# Patient Record
Sex: FEMALE | Race: WHITE | NOT HISPANIC OR LATINO | Employment: OTHER | URBAN - METROPOLITAN AREA
[De-identification: names, ages, dates, MRNs, and addresses within clinical notes are randomized per-mention and may not be internally consistent; named-entity substitution may affect disease eponyms.]

---

## 2023-04-03 DIAGNOSIS — F41.8 DEPRESSION WITH ANXIETY: ICD-10-CM

## 2023-04-03 DIAGNOSIS — E11.65 UNCONTROLLED TYPE 2 DIABETES MELLITUS WITH HYPERGLYCEMIA (MULTI): Primary | ICD-10-CM

## 2023-04-03 PROBLEM — R41.89 BRAIN FOG: Status: ACTIVE | Noted: 2023-04-03

## 2023-04-03 PROBLEM — R79.89 HIGH SERUM FERRITIN: Status: ACTIVE | Noted: 2023-04-03

## 2023-04-03 PROBLEM — R06.81 WITNESSED APNEIC SPELLS: Status: ACTIVE | Noted: 2023-04-03

## 2023-04-03 PROBLEM — R00.0 TACHYCARDIA: Status: ACTIVE | Noted: 2023-04-03

## 2023-04-03 PROBLEM — N89.8 VAGINAL IRRITATION: Status: ACTIVE | Noted: 2023-04-03

## 2023-04-03 PROBLEM — R06.02 SOBOE (SHORTNESS OF BREATH ON EXERTION): Status: ACTIVE | Noted: 2023-04-03

## 2023-04-03 PROBLEM — R68.89 WORSENING FUNCTIONAL ENDURANCE: Status: ACTIVE | Noted: 2023-04-03

## 2023-04-03 PROBLEM — G89.29 CHRONIC PAIN: Status: ACTIVE | Noted: 2023-04-03

## 2023-04-03 PROBLEM — R30.0 BURNING WITH URINATION: Status: ACTIVE | Noted: 2023-04-03

## 2023-04-03 PROBLEM — N89.8 VAGINAL DISCHARGE: Status: ACTIVE | Noted: 2023-04-03

## 2023-04-03 PROBLEM — R82.998 LEUKOCYTES IN URINE: Status: ACTIVE | Noted: 2023-04-03

## 2023-04-03 PROBLEM — R42 DIZZINESS: Status: ACTIVE | Noted: 2023-04-03

## 2023-04-03 PROBLEM — R79.89 ABNORMAL TSH: Status: ACTIVE | Noted: 2023-04-03

## 2023-04-03 PROBLEM — J44.9 COPD (CHRONIC OBSTRUCTIVE PULMONARY DISEASE) (MULTI): Status: ACTIVE | Noted: 2023-04-03

## 2023-04-03 PROBLEM — E78.2 MIXED HYPERLIPIDEMIA: Status: ACTIVE | Noted: 2023-04-03

## 2023-04-03 PROBLEM — N89.8 ITCHING OF VAGINA: Status: ACTIVE | Noted: 2023-04-03

## 2023-04-03 PROBLEM — M54.9 CHRONIC BACK PAIN: Status: ACTIVE | Noted: 2023-04-03

## 2023-04-03 PROBLEM — R26.89 IMPAIRMENT OF BALANCE: Status: ACTIVE | Noted: 2023-04-03

## 2023-04-03 PROBLEM — G31.84 MILD COGNITIVE IMPAIRMENT WITH MEMORY LOSS: Status: ACTIVE | Noted: 2023-04-03

## 2023-04-03 PROBLEM — R51.9 HEADACHE: Status: ACTIVE | Noted: 2023-04-03

## 2023-04-03 PROBLEM — G62.9 PERIPHERAL NEUROPATHY: Status: ACTIVE | Noted: 2023-04-03

## 2023-04-03 PROBLEM — R73.03 PREDIABETES: Status: ACTIVE | Noted: 2023-04-03

## 2023-04-03 PROBLEM — E03.9 ACQUIRED HYPOTHYROIDISM: Status: ACTIVE | Noted: 2023-04-03

## 2023-04-03 PROBLEM — I10 BENIGN ESSENTIAL HYPERTENSION: Status: ACTIVE | Noted: 2023-04-03

## 2023-04-03 PROBLEM — R31.9 BLOOD IN URINE: Status: ACTIVE | Noted: 2023-04-03

## 2023-04-03 PROBLEM — G89.29 CHRONIC BACK PAIN: Status: ACTIVE | Noted: 2023-04-03

## 2023-04-03 PROBLEM — R40.0 DAYTIME SOMNOLENCE: Status: ACTIVE | Noted: 2023-04-03

## 2023-04-03 RX ORDER — METFORMIN HYDROCHLORIDE 500 MG/1
500 TABLET ORAL 2 TIMES DAILY
Qty: 180 TABLET | Refills: 1 | Status: SHIPPED | OUTPATIENT
Start: 2023-04-03 | End: 2023-10-02

## 2023-04-03 RX ORDER — VENLAFAXINE HYDROCHLORIDE 150 MG/1
150 CAPSULE, EXTENDED RELEASE ORAL DAILY
Qty: 90 CAPSULE | Refills: 1 | Status: SHIPPED | OUTPATIENT
Start: 2023-04-03 | End: 2023-07-10 | Stop reason: ALTCHOICE

## 2023-04-03 RX ORDER — METFORMIN HYDROCHLORIDE 500 MG/1
500 TABLET ORAL 2 TIMES DAILY
COMMUNITY
End: 2023-04-03 | Stop reason: SDUPTHER

## 2023-04-03 RX ORDER — VENLAFAXINE HYDROCHLORIDE 150 MG/1
150 CAPSULE, EXTENDED RELEASE ORAL
COMMUNITY
Start: 2023-03-06 | End: 2023-04-03 | Stop reason: SDUPTHER

## 2023-05-31 DIAGNOSIS — F41.8 DEPRESSION WITH ANXIETY: Primary | ICD-10-CM

## 2023-05-31 RX ORDER — VENLAFAXINE HYDROCHLORIDE 75 MG/1
75 CAPSULE, EXTENDED RELEASE ORAL DAILY
COMMUNITY
Start: 2023-05-05 | End: 2023-05-31 | Stop reason: SDUPTHER

## 2023-05-31 RX ORDER — VENLAFAXINE HYDROCHLORIDE 75 MG/1
75 CAPSULE, EXTENDED RELEASE ORAL DAILY
Qty: 90 CAPSULE | Refills: 3 | Status: SHIPPED | OUTPATIENT
Start: 2023-05-31 | End: 2024-06-10

## 2023-07-10 ENCOUNTER — OFFICE VISIT (OUTPATIENT)
Dept: PRIMARY CARE | Facility: CLINIC | Age: 68
End: 2023-07-10
Payer: COMMERCIAL

## 2023-07-10 VITALS
HEART RATE: 78 BPM | RESPIRATION RATE: 20 BRPM | DIASTOLIC BLOOD PRESSURE: 68 MMHG | WEIGHT: 179 LBS | SYSTOLIC BLOOD PRESSURE: 126 MMHG | HEIGHT: 65 IN | BODY MASS INDEX: 29.82 KG/M2

## 2023-07-10 DIAGNOSIS — Z00.00 ROUTINE GENERAL MEDICAL EXAMINATION AT A HEALTH CARE FACILITY: Primary | ICD-10-CM

## 2023-07-10 DIAGNOSIS — Z12.11 SCREENING FOR COLON CANCER: ICD-10-CM

## 2023-07-10 DIAGNOSIS — N18.32 STAGE 3B CHRONIC KIDNEY DISEASE (MULTI): ICD-10-CM

## 2023-07-10 DIAGNOSIS — I10 BENIGN ESSENTIAL HYPERTENSION: ICD-10-CM

## 2023-07-10 DIAGNOSIS — M54.50 CHRONIC BILATERAL LOW BACK PAIN WITHOUT SCIATICA: ICD-10-CM

## 2023-07-10 DIAGNOSIS — Z78.0 POST-MENOPAUSAL: ICD-10-CM

## 2023-07-10 DIAGNOSIS — J44.9 CHRONIC OBSTRUCTIVE PULMONARY DISEASE, UNSPECIFIED COPD TYPE (MULTI): ICD-10-CM

## 2023-07-10 DIAGNOSIS — E11.9 TYPE 2 DIABETES MELLITUS WITHOUT COMPLICATION, WITHOUT LONG-TERM CURRENT USE OF INSULIN (MULTI): ICD-10-CM

## 2023-07-10 DIAGNOSIS — E78.2 MIXED HYPERLIPIDEMIA: ICD-10-CM

## 2023-07-10 DIAGNOSIS — R68.89 WORSENING FUNCTIONAL ENDURANCE: ICD-10-CM

## 2023-07-10 DIAGNOSIS — R79.89 HIGH SERUM FERRITIN: ICD-10-CM

## 2023-07-10 DIAGNOSIS — E03.9 ACQUIRED HYPOTHYROIDISM: ICD-10-CM

## 2023-07-10 DIAGNOSIS — F41.8 DEPRESSION WITH ANXIETY: ICD-10-CM

## 2023-07-10 DIAGNOSIS — Z12.31 ENCOUNTER FOR SCREENING MAMMOGRAM FOR MALIGNANT NEOPLASM OF BREAST: ICD-10-CM

## 2023-07-10 DIAGNOSIS — G31.84 MILD COGNITIVE IMPAIRMENT WITH MEMORY LOSS: ICD-10-CM

## 2023-07-10 DIAGNOSIS — G89.29 CHRONIC BILATERAL LOW BACK PAIN WITHOUT SCIATICA: ICD-10-CM

## 2023-07-10 DIAGNOSIS — G47.33 OSA ON CPAP: ICD-10-CM

## 2023-07-10 DIAGNOSIS — Z23 NEED FOR PNEUMOCOCCAL 20-VALENT CONJUGATE VACCINATION: ICD-10-CM

## 2023-07-10 DIAGNOSIS — G62.9 PERIPHERAL POLYNEUROPATHY: ICD-10-CM

## 2023-07-10 PROBLEM — R42 DIZZINESS: Status: RESOLVED | Noted: 2023-04-03 | Resolved: 2023-07-10

## 2023-07-10 PROBLEM — N89.8 VAGINAL IRRITATION: Status: RESOLVED | Noted: 2023-04-03 | Resolved: 2023-07-10

## 2023-07-10 PROBLEM — N89.8 ITCHING OF VAGINA: Status: RESOLVED | Noted: 2023-04-03 | Resolved: 2023-07-10

## 2023-07-10 PROBLEM — R82.998 LEUKOCYTES IN URINE: Status: RESOLVED | Noted: 2023-04-03 | Resolved: 2023-07-10

## 2023-07-10 PROBLEM — R41.89 BRAIN FOG: Status: RESOLVED | Noted: 2023-04-03 | Resolved: 2023-07-10

## 2023-07-10 PROBLEM — E11.65 UNCONTROLLED TYPE 2 DIABETES MELLITUS WITH HYPERGLYCEMIA (MULTI): Status: RESOLVED | Noted: 2023-04-03 | Resolved: 2023-07-10

## 2023-07-10 PROBLEM — N89.8 VAGINAL DISCHARGE: Status: RESOLVED | Noted: 2023-04-03 | Resolved: 2023-07-10

## 2023-07-10 PROBLEM — R31.9 BLOOD IN URINE: Status: RESOLVED | Noted: 2023-04-03 | Resolved: 2023-07-10

## 2023-07-10 PROBLEM — R30.0 BURNING WITH URINATION: Status: RESOLVED | Noted: 2023-04-03 | Resolved: 2023-07-10

## 2023-07-10 PROBLEM — R51.9 HEADACHE: Status: RESOLVED | Noted: 2023-04-03 | Resolved: 2023-07-10

## 2023-07-10 PROBLEM — R06.02 SOBOE (SHORTNESS OF BREATH ON EXERTION): Status: RESOLVED | Noted: 2023-04-03 | Resolved: 2023-07-10

## 2023-07-10 PROCEDURE — 3074F SYST BP LT 130 MM HG: CPT | Performed by: INTERNAL MEDICINE

## 2023-07-10 PROCEDURE — 3044F HG A1C LEVEL LT 7.0%: CPT | Performed by: INTERNAL MEDICINE

## 2023-07-10 PROCEDURE — 90471 IMMUNIZATION ADMIN: CPT | Performed by: INTERNAL MEDICINE

## 2023-07-10 PROCEDURE — 1159F MED LIST DOCD IN RCRD: CPT | Performed by: INTERNAL MEDICINE

## 2023-07-10 PROCEDURE — 99397 PER PM REEVAL EST PAT 65+ YR: CPT | Performed by: INTERNAL MEDICINE

## 2023-07-10 PROCEDURE — 4004F PT TOBACCO SCREEN RCVD TLK: CPT | Performed by: INTERNAL MEDICINE

## 2023-07-10 PROCEDURE — 1160F RVW MEDS BY RX/DR IN RCRD: CPT | Performed by: INTERNAL MEDICINE

## 2023-07-10 PROCEDURE — 3078F DIAST BP <80 MM HG: CPT | Performed by: INTERNAL MEDICINE

## 2023-07-10 PROCEDURE — 90677 PCV20 VACCINE IM: CPT | Performed by: INTERNAL MEDICINE

## 2023-07-10 RX ORDER — BUDESONIDE AND FORMOTEROL FUMARATE DIHYDRATE 160; 4.5 UG/1; UG/1
2 AEROSOL RESPIRATORY (INHALATION) 2 TIMES DAILY
Qty: 1 EACH | Refills: 5 | Status: SHIPPED | OUTPATIENT
Start: 2023-07-10 | End: 2023-07-12 | Stop reason: SDUPTHER

## 2023-07-10 ASSESSMENT — ENCOUNTER SYMPTOMS
ARTHRALGIAS: 1
NECK PAIN: 0
SINUS PRESSURE: 0
FLANK PAIN: 0
PHOTOPHOBIA: 0
SORE THROAT: 0
DYSPHORIC MOOD: 0
WHEEZING: 0
ACTIVITY CHANGE: 0
RECTAL PAIN: 0
FACIAL SWELLING: 0
BACK PAIN: 1
BRUISES/BLEEDS EASILY: 0
FACIAL ASYMMETRY: 0
WOUND: 0
EYE ITCHING: 0
COUGH: 0
HEMATURIA: 0
STRIDOR: 0
DIFFICULTY URINATING: 0
VOICE CHANGE: 0
MYALGIAS: 0
POLYDIPSIA: 0
NECK STIFFNESS: 0
CHEST TIGHTNESS: 0
TREMORS: 0
FREQUENCY: 0
VOMITING: 0
SHORTNESS OF BREATH: 1
EYE DISCHARGE: 0
DECREASED CONCENTRATION: 0
APPETITE CHANGE: 0
DIAPHORESIS: 0
COLOR CHANGE: 0
CHOKING: 0
DYSURIA: 0
POLYPHAGIA: 0
SLEEP DISTURBANCE: 1
ADENOPATHY: 0
JOINT SWELLING: 0
UNEXPECTED WEIGHT CHANGE: 0
NUMBNESS: 0
RHINORRHEA: 0
DIARRHEA: 0
FATIGUE: 1
PALPITATIONS: 1
APNEA: 0
SPEECH DIFFICULTY: 0
HYPERACTIVE: 0
LIGHT-HEADEDNESS: 0
SINUS PAIN: 0
FEVER: 0
DIZZINESS: 0
EYE PAIN: 0
ABDOMINAL DISTENTION: 0
NERVOUS/ANXIOUS: 0
NAUSEA: 1
HEADACHES: 0
ABDOMINAL PAIN: 0
EYE REDNESS: 0
HALLUCINATIONS: 0
BLOOD IN STOOL: 0
CONSTIPATION: 0
CONFUSION: 1
TROUBLE SWALLOWING: 0
AGITATION: 0
CHILLS: 0
ANAL BLEEDING: 0
WEAKNESS: 0
SEIZURES: 0

## 2023-07-10 NOTE — ASSESSMENT & PLAN NOTE
Pt is past due on all screening measures  Encouraged her and strongly recommend she do the mammogram/bone density and colonoscopy-all orders put in; she feels very overwhelmed by all she has to do   Gave her Prevnar 20 today as she agreed to update this; encouraged and recommend the Shingrix vaccine

## 2023-07-10 NOTE — ASSESSMENT & PLAN NOTE
Pt with some cognitive changes noted during exam  She recalled cardiology visit in mid June when her heart rate was too high to complete stress test but she had no recollection of it being done in mid July (she was insistent that it did not happen)   Will continue to monitor during exams-will do MMSE at next visit

## 2023-07-10 NOTE — PATIENT INSTRUCTIONS
Get your blood work and urine done as soon as able (fast-no food after midnight-can have water/black coffee in AM)  Call and schedule your bone density and your mammogram   Call and schedule your colonoscopy   Once you get your labs done we will decide on new medication if any for blood pressure  We did refill on the Symbicort to local pharmacy  Continue to work on lessening and if able quit smoking all together  Look for flu and covid boosters come fall  Consider having the most updated Shingles vaccine (2 shots then done)  You got the prevnar 20 shot (pneumonia) vaccine today   Follow up in 3 months

## 2023-07-10 NOTE — PROGRESS NOTES
Subjective   Patient ID: Maria Elena Mendez is a 68 y.o. female who presents for Annual Exam.    HPI    Pt here for full physical.  Her weight is stable.  She is always tired and feels weak with minimal exertion.  She is not exercising as much as she has in the past.      She tells me her breathing is often labored when doing anything exertional.  She will take a shower and feel very winded from this.  She denies cough or tightness to chest.  She is on inhaler therapy.  She is using CPAP nightly.   She had nuclear stress test in 7/2022 that was normal-EF was 64%.      She is having worsening neuropathy of her feet.  It is very bad at night.  She also has numbness/pain in her upper legs now to.      She has some nausea every morning.  She has no bowel issues.  Colonoscopy was in 2008 and it was normal.  She has not returned to have another one.      She has not had a mammogram.  She denies any breast symptoms.      She continues to smoke she was a 1/2 ppd smoker for much of her life but did smoke as much as 1 full pack and now is down to 3 cigs/day.          Review of Systems   Constitutional:  Positive for fatigue. Negative for activity change, appetite change, chills, diaphoresis, fever and unexpected weight change.   HENT:  Negative for congestion, dental problem, drooling, ear discharge, ear pain, facial swelling, hearing loss, mouth sores, nosebleeds, postnasal drip, rhinorrhea, sinus pressure, sinus pain, sneezing, sore throat, tinnitus, trouble swallowing and voice change.    Eyes:  Positive for visual disturbance (due for appointment). Negative for photophobia, pain, discharge, redness and itching.   Respiratory:  Positive for shortness of breath. Negative for apnea, cough, choking, chest tightness, wheezing and stridor.    Cardiovascular:  Positive for palpitations. Negative for chest pain and leg swelling.   Gastrointestinal:  Positive for nausea. Negative for abdominal distention, abdominal pain, anal  "bleeding, blood in stool, constipation, diarrhea, rectal pain and vomiting.   Endocrine: Negative for cold intolerance, heat intolerance, polydipsia, polyphagia and polyuria.   Genitourinary:  Negative for decreased urine volume, difficulty urinating, dyspareunia, dysuria, enuresis, flank pain, frequency, genital sores, hematuria, menstrual problem, pelvic pain, urgency, vaginal bleeding, vaginal discharge and vaginal pain.   Musculoskeletal:  Positive for arthralgias and back pain. Negative for gait problem, joint swelling, myalgias, neck pain and neck stiffness.   Skin:  Negative for color change, pallor, rash and wound.   Allergic/Immunologic: Negative for environmental allergies, food allergies and immunocompromised state.   Neurological:  Negative for dizziness, tremors, seizures, syncope, facial asymmetry, speech difficulty, weakness, light-headedness, numbness and headaches.   Hematological:  Negative for adenopathy. Does not bruise/bleed easily.   Psychiatric/Behavioral:  Positive for confusion and sleep disturbance. Negative for agitation, behavioral problems, decreased concentration, dysphoric mood, hallucinations, self-injury and suicidal ideas. The patient is not nervous/anxious and is not hyperactive.        Objective   /68   Pulse 78   Resp 20   Ht 1.651 m (5' 5\")   Wt 81.2 kg (179 lb)   BMI 29.79 kg/m²    Physical Exam  Constitutional:       Appearance: Normal appearance.   HENT:      Head: Normocephalic and atraumatic.      Right Ear: Tympanic membrane, ear canal and external ear normal. There is no impacted cerumen.      Left Ear: Tympanic membrane, ear canal and external ear normal. There is no impacted cerumen.      Nose: Nose normal. No congestion or rhinorrhea.      Mouth/Throat:      Mouth: Mucous membranes are moist.      Pharynx: Oropharynx is clear. No oropharyngeal exudate or posterior oropharyngeal erythema.   Eyes:      Extraocular Movements: Extraocular movements intact.      " Conjunctiva/sclera: Conjunctivae normal.      Pupils: Pupils are equal, round, and reactive to light.   Neck:      Vascular: No carotid bruit.   Cardiovascular:      Rate and Rhythm: Normal rate and regular rhythm.      Pulses: Normal pulses.      Heart sounds: Normal heart sounds. No murmur heard.  Pulmonary:      Effort: Pulmonary effort is normal. No respiratory distress.      Breath sounds: Normal breath sounds. No wheezing, rhonchi or rales.   Abdominal:      General: Abdomen is flat. Bowel sounds are normal. There is no distension.      Palpations: Abdomen is soft.      Tenderness: There is no abdominal tenderness.      Hernia: No hernia is present.   Musculoskeletal:         General: No swelling or tenderness. Normal range of motion.      Cervical back: Normal range of motion and neck supple.      Right lower leg: No edema.      Left lower leg: No edema.   Lymphadenopathy:      Cervical: No cervical adenopathy.   Skin:     General: Skin is warm and dry.      Findings: No lesion or rash.   Neurological:      General: No focal deficit present.      Mental Status: She is alert and oriented to person, place, and time.      Cranial Nerves: No cranial nerve deficit.      Sensory: No sensory deficit.      Motor: No weakness.   Psychiatric:         Mood and Affect: Mood normal.         Behavior: Behavior normal.         Thought Content: Thought content normal.         Judgment: Judgment normal.         Assessment/Plan   Problem List Items Addressed This Visit       Acquired hypothyroidism     On Levothyroxine  Pt with a lot of fatigue/memory changes  Needs repeat TSH-order placed          Relevant Orders    Thyroid Stimulating Hormone    Benign essential hypertension     Improved after patient sat for a period of time in office  She is on low dose Metoprolol          Relevant Orders    CBC    Comprehensive Metabolic Panel    Urinalysis with Reflex Microscopic    Follow Up In Primary Care - Established    Chronic back  pain    COPD (chronic obstructive pulmonary disease) (CMS/[facility])    Relevant Medications    Symbicort 160-4.5 mcg/actuation inhaler    Depression with anxiety     On Venlafaxine          High serum ferritin    Relevant Orders    Ferritin    Iron and TIBC    Mild cognitive impairment with memory loss     Pt with some cognitive changes noted during exam  She recalled cardiology visit in mid June when her heart rate was too high to complete stress test but she had no recollection of it being done in mid July (she was insistent that it did not happen)   Will continue to monitor during exams-will do MMSE at next visit          Relevant Orders    Vitamin B12    Mixed hyperlipidemia     On Crestor will order labs to be done when fasting          Relevant Orders    Comprehensive Metabolic Panel    Lipid Panel    Peripheral neuropathy     Worsening pain-will check A1c but can consider use of gabapentin and/or lyrica to help with this          Worsening functional endurance    Type 2 diabetes mellitus without complication, without long-term current use of insulin (CMS/[facility])     On Metformin  Repeat A1c/urine albumin  Low carb/sugar in diet          Relevant Orders    Comprehensive Metabolic Panel    Lipid Panel    Urinalysis with Reflex Microscopic    Hemoglobin A1C    Albumin , Urine Random    Follow Up In Primary Care - Established    FLORIN on CPAP    Stage 3b chronic kidney disease    Relevant Orders    Vitamin D 25-Hydroxy,Total (for eval of Vitamin D levels)    Routine general medical examination at a health care facility - Primary     Pt is past due on all screening measures  Encouraged her and strongly recommend she do the mammogram/bone density and colonoscopy-all orders put in; she feels very overwhelmed by all she has to do   Gave her Prevnar 20 today as she agreed to update this; encouraged and recommend the Shingrix vaccine           Other Visit Diagnoses       Encounter for screening mammogram for malignant neoplasm  of breast        Relevant Orders    BI mammo bilateral screening tomosynthesis    Screening for colon cancer        Relevant Orders    Colonoscopy    Post-menopausal        Relevant Orders    XR DEXA bone density    Need for pneumococcal 20-valent conjugate vaccination        Relevant Orders    Pneumococcal conjugate vaccine, 20-valent, adult (PREVNAR 20) (Completed)

## 2023-07-12 DIAGNOSIS — J44.9 CHRONIC OBSTRUCTIVE PULMONARY DISEASE, UNSPECIFIED COPD TYPE (MULTI): ICD-10-CM

## 2023-07-12 RX ORDER — BUDESONIDE AND FORMOTEROL FUMARATE DIHYDRATE 160; 4.5 UG/1; UG/1
2 AEROSOL RESPIRATORY (INHALATION) 2 TIMES DAILY
Qty: 1 EACH | Refills: 5 | Status: SHIPPED | OUTPATIENT
Start: 2023-07-12

## 2023-07-28 ENCOUNTER — LAB (OUTPATIENT)
Dept: LAB | Facility: LAB | Age: 68
End: 2023-07-28
Payer: COMMERCIAL

## 2023-07-28 DIAGNOSIS — R79.89 HIGH SERUM FERRITIN: ICD-10-CM

## 2023-07-28 DIAGNOSIS — E78.2 MIXED HYPERLIPIDEMIA: ICD-10-CM

## 2023-07-28 DIAGNOSIS — E11.9 TYPE 2 DIABETES MELLITUS WITHOUT COMPLICATION, WITHOUT LONG-TERM CURRENT USE OF INSULIN (MULTI): ICD-10-CM

## 2023-07-28 DIAGNOSIS — I10 BENIGN ESSENTIAL HYPERTENSION: ICD-10-CM

## 2023-07-28 DIAGNOSIS — E03.9 ACQUIRED HYPOTHYROIDISM: ICD-10-CM

## 2023-07-28 DIAGNOSIS — G31.84 MILD COGNITIVE IMPAIRMENT WITH MEMORY LOSS: ICD-10-CM

## 2023-07-28 DIAGNOSIS — N18.32 STAGE 3B CHRONIC KIDNEY DISEASE (MULTI): ICD-10-CM

## 2023-07-28 LAB
ALANINE AMINOTRANSFERASE (SGPT) (U/L) IN SER/PLAS: 14 U/L (ref 7–45)
ALBUMIN (G/DL) IN SER/PLAS: 4.6 G/DL (ref 3.4–5)
ALKALINE PHOSPHATASE (U/L) IN SER/PLAS: 44 U/L (ref 33–136)
ANION GAP IN SER/PLAS: 18 MMOL/L (ref 10–20)
ASPARTATE AMINOTRANSFERASE (SGOT) (U/L) IN SER/PLAS: 16 U/L (ref 9–39)
BILIRUBIN TOTAL (MG/DL) IN SER/PLAS: 0.5 MG/DL (ref 0–1.2)
CALCIDIOL (25 OH VITAMIN D3) (NG/ML) IN SER/PLAS: 50 NG/ML
CALCIUM (MG/DL) IN SER/PLAS: 9.8 MG/DL (ref 8.6–10.6)
CARBON DIOXIDE, TOTAL (MMOL/L) IN SER/PLAS: 22 MMOL/L (ref 21–32)
CHLORIDE (MMOL/L) IN SER/PLAS: 104 MMOL/L (ref 98–107)
CHOLESTEROL (MG/DL) IN SER/PLAS: 145 MG/DL (ref 0–199)
CHOLESTEROL IN HDL (MG/DL) IN SER/PLAS: 47.7 MG/DL
CHOLESTEROL/HDL RATIO: 3
COBALAMIN (VITAMIN B12) (PG/ML) IN SER/PLAS: 472 PG/ML (ref 211–911)
CREATININE (MG/DL) IN SER/PLAS: 1.28 MG/DL (ref 0.5–1.05)
ERYTHROCYTE DISTRIBUTION WIDTH (RATIO) BY AUTOMATED COUNT: 13.3 % (ref 11.5–14.5)
ERYTHROCYTE MEAN CORPUSCULAR HEMOGLOBIN CONCENTRATION (G/DL) BY AUTOMATED: 32.1 G/DL (ref 32–36)
ERYTHROCYTE MEAN CORPUSCULAR VOLUME (FL) BY AUTOMATED COUNT: 97 FL (ref 80–100)
ERYTHROCYTES (10*6/UL) IN BLOOD BY AUTOMATED COUNT: 4.34 X10E12/L (ref 4–5.2)
ESTIMATED AVERAGE GLUCOSE FOR HBA1C: 134 MG/DL
FERRITIN (UG/LL) IN SER/PLAS: 169 UG/L (ref 8–150)
GFR FEMALE: 45 ML/MIN/1.73M2
GLUCOSE (MG/DL) IN SER/PLAS: 163 MG/DL (ref 74–99)
HEMATOCRIT (%) IN BLOOD BY AUTOMATED COUNT: 42 % (ref 36–46)
HEMOGLOBIN (G/DL) IN BLOOD: 13.5 G/DL (ref 12–16)
HEMOGLOBIN A1C/HEMOGLOBIN TOTAL IN BLOOD: 6.3 %
IRON (UG/DL) IN SER/PLAS: 139 UG/DL (ref 35–150)
IRON BINDING CAPACITY (UG/DL) IN SER/PLAS: 393 UG/DL (ref 240–445)
IRON SATURATION (%) IN SER/PLAS: 35 % (ref 25–45)
LDL: ABNORMAL MG/DL (ref 0–99)
LEUKOCYTES (10*3/UL) IN BLOOD BY AUTOMATED COUNT: 7.9 X10E9/L (ref 4.4–11.3)
NON HDL CHOLESTEROL: 97 MG/DL
NRBC (PER 100 WBCS) BY AUTOMATED COUNT: 0 /100 WBC (ref 0–0)
PLATELETS (10*3/UL) IN BLOOD AUTOMATED COUNT: 232 X10E9/L (ref 150–450)
POTASSIUM (MMOL/L) IN SER/PLAS: 4.3 MMOL/L (ref 3.5–5.3)
PROTEIN TOTAL: 6.7 G/DL (ref 6.4–8.2)
SODIUM (MMOL/L) IN SER/PLAS: 140 MMOL/L (ref 136–145)
THYROTROPIN (MIU/L) IN SER/PLAS BY DETECTION LIMIT <= 0.05 MIU/L: 0.41 MIU/L (ref 0.44–3.98)
TRIGLYCERIDE (MG/DL) IN SER/PLAS: 425 MG/DL (ref 0–149)
UREA NITROGEN (MG/DL) IN SER/PLAS: 20 MG/DL (ref 6–23)
VLDL: ABNORMAL MG/DL (ref 0–40)

## 2023-07-28 PROCEDURE — 83550 IRON BINDING TEST: CPT

## 2023-07-28 PROCEDURE — 36415 COLL VENOUS BLD VENIPUNCTURE: CPT

## 2023-07-28 PROCEDURE — 82728 ASSAY OF FERRITIN: CPT

## 2023-07-28 PROCEDURE — 80053 COMPREHEN METABOLIC PANEL: CPT

## 2023-07-28 PROCEDURE — 80061 LIPID PANEL: CPT

## 2023-07-28 PROCEDURE — 83036 HEMOGLOBIN GLYCOSYLATED A1C: CPT

## 2023-07-28 PROCEDURE — 85027 COMPLETE CBC AUTOMATED: CPT

## 2023-07-28 PROCEDURE — 84443 ASSAY THYROID STIM HORMONE: CPT

## 2023-07-28 PROCEDURE — 82306 VITAMIN D 25 HYDROXY: CPT

## 2023-07-28 PROCEDURE — 81001 URINALYSIS AUTO W/SCOPE: CPT

## 2023-07-28 PROCEDURE — 82607 VITAMIN B-12: CPT

## 2023-07-28 PROCEDURE — 83540 ASSAY OF IRON: CPT

## 2023-07-29 DIAGNOSIS — R79.89 ABNORMAL TSH: ICD-10-CM

## 2023-07-29 DIAGNOSIS — E03.9 ACQUIRED HYPOTHYROIDISM: Primary | ICD-10-CM

## 2023-07-29 LAB
APPEARANCE, URINE: ABNORMAL
BILIRUBIN, URINE: NEGATIVE
BLOOD, URINE: NEGATIVE
COLOR, URINE: YELLOW
GLUCOSE, URINE: NEGATIVE MG/DL
KETONES, URINE: ABNORMAL MG/DL
LEUKOCYTE ESTERASE, URINE: ABNORMAL
MUCUS, URINE: NORMAL /LPF
NITRITE, URINE: NEGATIVE
PH, URINE: 5 (ref 5–8)
PROTEIN, URINE: NEGATIVE MG/DL
RBC, URINE: 1 /HPF (ref 0–5)
SPECIFIC GRAVITY, URINE: 1.02 (ref 1–1.03)
SQUAMOUS EPITHELIAL CELLS, URINE: 1 /HPF
UROBILINOGEN, URINE: <2 MG/DL (ref 0–1.9)
WBC, URINE: 5 /HPF (ref 0–5)

## 2023-08-04 DIAGNOSIS — E03.9 HYPOTHYROIDISM, UNSPECIFIED: ICD-10-CM

## 2023-08-05 RX ORDER — LEVOTHYROXINE SODIUM 88 UG/1
88 TABLET ORAL DAILY
Qty: 90 TABLET | Refills: 1 | Status: SHIPPED | OUTPATIENT
Start: 2023-08-05 | End: 2024-03-14

## 2023-09-13 RX ORDER — LEVOTHYROXINE SODIUM 50 UG/1
50 TABLET ORAL
COMMUNITY
End: 2023-10-26

## 2023-09-13 RX ORDER — OXYCODONE AND ACETAMINOPHEN 7.5; 325 MG/1; MG/1
TABLET ORAL
COMMUNITY
End: 2023-10-26 | Stop reason: ALTCHOICE

## 2023-09-13 RX ORDER — PHENYLPROPANOLAMINE/CLEMASTINE 75-1.34MG
200 TABLET, EXTENDED RELEASE ORAL
COMMUNITY

## 2023-09-13 RX ORDER — LEVOTHYROXINE SODIUM 100 UG/1
100 TABLET ORAL
COMMUNITY
Start: 2023-02-04 | End: 2023-10-26

## 2023-09-13 RX ORDER — ZINC GLUCONATE 50 MG
TABLET ORAL
COMMUNITY

## 2023-09-29 DIAGNOSIS — E11.65 UNCONTROLLED TYPE 2 DIABETES MELLITUS WITH HYPERGLYCEMIA (MULTI): ICD-10-CM

## 2023-10-02 RX ORDER — METFORMIN HYDROCHLORIDE 500 MG/1
500 TABLET ORAL 2 TIMES DAILY
Qty: 180 TABLET | Refills: 1 | Status: SHIPPED | OUTPATIENT
Start: 2023-10-02 | End: 2024-04-10

## 2023-10-09 ENCOUNTER — TELEPHONE (OUTPATIENT)
Dept: PRIMARY CARE | Facility: CLINIC | Age: 68
End: 2023-10-09
Payer: COMMERCIAL

## 2023-10-09 NOTE — TELEPHONE ENCOUNTER
Would she want to try other location through ?  [facility]?  [facility] [place]?  [facility]?  Otherwise can wait till beginning of year

## 2023-10-09 NOTE — TELEPHONE ENCOUNTER
Maria Elena can not get in for a colonoscopy with [facility] til beginning of next year, asking if she should do the cologuard? Thoughts? Last colonoscopy was 15 years ago.

## 2023-10-10 ENCOUNTER — APPOINTMENT (OUTPATIENT)
Dept: PRIMARY CARE | Facility: CLINIC | Age: 68
End: 2023-10-10
Payer: COMMERCIAL

## 2023-10-18 PROCEDURE — 96375 TX/PRO/DX INJ NEW DRUG ADDON: CPT

## 2023-10-18 PROCEDURE — 99284 EMERGENCY DEPT VISIT MOD MDM: CPT | Performed by: EMERGENCY MEDICINE

## 2023-10-18 PROCEDURE — 96374 THER/PROPH/DIAG INJ IV PUSH: CPT

## 2023-10-18 ASSESSMENT — COLUMBIA-SUICIDE SEVERITY RATING SCALE - C-SSRS
2. HAVE YOU ACTUALLY HAD ANY THOUGHTS OF KILLING YOURSELF?: NO
6. HAVE YOU EVER DONE ANYTHING, STARTED TO DO ANYTHING, OR PREPARED TO DO ANYTHING TO END YOUR LIFE?: NO
1. IN THE PAST MONTH, HAVE YOU WISHED YOU WERE DEAD OR WISHED YOU COULD GO TO SLEEP AND NOT WAKE UP?: NO

## 2023-10-18 ASSESSMENT — PAIN - FUNCTIONAL ASSESSMENT: PAIN_FUNCTIONAL_ASSESSMENT: 0-10

## 2023-10-18 ASSESSMENT — PAIN SCALES - GENERAL: PAINLEVEL_OUTOF10: 6

## 2023-10-19 ENCOUNTER — HOSPITAL ENCOUNTER (EMERGENCY)
Facility: HOSPITAL | Age: 68
Discharge: HOME | End: 2023-10-19
Attending: EMERGENCY MEDICINE
Payer: COMMERCIAL

## 2023-10-19 ENCOUNTER — APPOINTMENT (OUTPATIENT)
Dept: RADIOLOGY | Facility: HOSPITAL | Age: 68
End: 2023-10-19
Payer: COMMERCIAL

## 2023-10-19 VITALS
RESPIRATION RATE: 18 BRPM | SYSTOLIC BLOOD PRESSURE: 158 MMHG | WEIGHT: 170 LBS | OXYGEN SATURATION: 96 % | TEMPERATURE: 97.7 F | BODY MASS INDEX: 28.32 KG/M2 | HEIGHT: 65 IN | HEART RATE: 77 BPM | DIASTOLIC BLOOD PRESSURE: 76 MMHG

## 2023-10-19 DIAGNOSIS — R07.9 CHEST PAIN, UNSPECIFIED TYPE: Primary | ICD-10-CM

## 2023-10-19 LAB
ALBUMIN SERPL BCP-MCNC: 4.4 G/DL (ref 3.4–5)
ALP SERPL-CCNC: 48 U/L (ref 33–136)
ALT SERPL W P-5'-P-CCNC: 18 U/L (ref 7–45)
ANION GAP SERPL CALC-SCNC: 13 MMOL/L (ref 10–20)
AST SERPL W P-5'-P-CCNC: 18 U/L (ref 9–39)
BASOPHILS # BLD AUTO: 0.08 X10*3/UL (ref 0–0.1)
BASOPHILS NFR BLD AUTO: 0.7 %
BILIRUB DIRECT SERPL-MCNC: 0.1 MG/DL (ref 0–0.3)
BILIRUB SERPL-MCNC: 0.3 MG/DL (ref 0–1.2)
BUN SERPL-MCNC: 25 MG/DL (ref 6–23)
CALCIUM SERPL-MCNC: 9.6 MG/DL (ref 8.6–10.3)
CARDIAC TROPONIN I PNL SERPL HS: 6 NG/L (ref 0–13)
CARDIAC TROPONIN I PNL SERPL HS: 7 NG/L (ref 0–13)
CHLORIDE SERPL-SCNC: 105 MMOL/L (ref 98–107)
CO2 SERPL-SCNC: 25 MMOL/L (ref 21–32)
CREAT SERPL-MCNC: 1.4 MG/DL (ref 0.5–1.05)
EOSINOPHIL # BLD AUTO: 0.31 X10*3/UL (ref 0–0.7)
EOSINOPHIL NFR BLD AUTO: 2.6 %
ERYTHROCYTE [DISTWIDTH] IN BLOOD BY AUTOMATED COUNT: 13.5 % (ref 11.5–14.5)
GFR SERPL CREATININE-BSD FRML MDRD: 41 ML/MIN/1.73M*2
GLUCOSE SERPL-MCNC: 113 MG/DL (ref 74–99)
HCT VFR BLD AUTO: 40.2 % (ref 36–46)
HGB BLD-MCNC: 13.3 G/DL (ref 12–16)
IMM GRANULOCYTES # BLD AUTO: 0.03 X10*3/UL (ref 0–0.7)
IMM GRANULOCYTES NFR BLD AUTO: 0.2 % (ref 0–0.9)
LIPASE SERPL-CCNC: 42 U/L (ref 9–82)
LYMPHOCYTES # BLD AUTO: 4.53 X10*3/UL (ref 1.2–4.8)
LYMPHOCYTES NFR BLD AUTO: 37.6 %
MAGNESIUM SERPL-MCNC: 1.99 MG/DL (ref 1.6–2.4)
MCH RBC QN AUTO: 31.5 PG (ref 26–34)
MCHC RBC AUTO-ENTMCNC: 33.1 G/DL (ref 32–36)
MCV RBC AUTO: 95 FL (ref 80–100)
MONOCYTES # BLD AUTO: 0.89 X10*3/UL (ref 0.1–1)
MONOCYTES NFR BLD AUTO: 7.4 %
NEUTROPHILS # BLD AUTO: 6.22 X10*3/UL (ref 1.2–7.7)
NEUTROPHILS NFR BLD AUTO: 51.5 %
NRBC BLD-RTO: 0 /100 WBCS (ref 0–0)
PLATELET # BLD AUTO: 207 X10*3/UL (ref 150–450)
PMV BLD AUTO: 9.2 FL (ref 7.5–11.5)
POTASSIUM SERPL-SCNC: 4 MMOL/L (ref 3.5–5.3)
PROT SERPL-MCNC: 6.4 G/DL (ref 6.4–8.2)
RBC # BLD AUTO: 4.22 X10*6/UL (ref 4–5.2)
SODIUM SERPL-SCNC: 139 MMOL/L (ref 136–145)
WBC # BLD AUTO: 12.1 X10*3/UL (ref 4.4–11.3)

## 2023-10-19 PROCEDURE — 82248 BILIRUBIN DIRECT: CPT | Performed by: NURSE PRACTITIONER

## 2023-10-19 PROCEDURE — 36415 COLL VENOUS BLD VENIPUNCTURE: CPT | Performed by: NURSE PRACTITIONER

## 2023-10-19 PROCEDURE — 84484 ASSAY OF TROPONIN QUANT: CPT | Performed by: NURSE PRACTITIONER

## 2023-10-19 PROCEDURE — 71046 X-RAY EXAM CHEST 2 VIEWS: CPT | Performed by: RADIOLOGY

## 2023-10-19 PROCEDURE — 2500000004 HC RX 250 GENERAL PHARMACY W/ HCPCS (ALT 636 FOR OP/ED): Performed by: NURSE PRACTITIONER

## 2023-10-19 PROCEDURE — 71046 X-RAY EXAM CHEST 2 VIEWS: CPT | Mod: FY

## 2023-10-19 PROCEDURE — 2500000005 HC RX 250 GENERAL PHARMACY W/O HCPCS: Performed by: NURSE PRACTITIONER

## 2023-10-19 PROCEDURE — 83690 ASSAY OF LIPASE: CPT | Performed by: NURSE PRACTITIONER

## 2023-10-19 PROCEDURE — 83735 ASSAY OF MAGNESIUM: CPT | Performed by: NURSE PRACTITIONER

## 2023-10-19 PROCEDURE — 85025 COMPLETE CBC W/AUTO DIFF WBC: CPT | Performed by: NURSE PRACTITIONER

## 2023-10-19 RX ORDER — ONDANSETRON HYDROCHLORIDE 2 MG/ML
4 INJECTION, SOLUTION INTRAVENOUS ONCE
Status: COMPLETED | OUTPATIENT
Start: 2023-10-19 | End: 2023-10-19

## 2023-10-19 RX ORDER — FAMOTIDINE 10 MG/ML
20 INJECTION INTRAVENOUS ONCE
Status: COMPLETED | OUTPATIENT
Start: 2023-10-19 | End: 2023-10-19

## 2023-10-19 RX ADMIN — SODIUM CHLORIDE 1000 ML: 9 INJECTION, SOLUTION INTRAVENOUS at 01:35

## 2023-10-19 RX ADMIN — ONDANSETRON 4 MG: 2 INJECTION INTRAMUSCULAR; INTRAVENOUS at 01:35

## 2023-10-19 RX ADMIN — FAMOTIDINE 20 MG: 10 INJECTION, SOLUTION INTRAVENOUS at 01:35

## 2023-10-19 RX ADMIN — DIPHENHYDRAMINE HYDROCHLORIDE AND LIDOCAINE HYDROCHLORIDE AND ALUMINUM HYDROXIDE AND MAGNESIUM HYDRO 10 ML: KIT at 02:45

## 2023-10-19 ASSESSMENT — LIFESTYLE VARIABLES
HAVE PEOPLE ANNOYED YOU BY CRITICIZING YOUR DRINKING: NO
EVER FELT BAD OR GUILTY ABOUT YOUR DRINKING: NO
HAVE YOU EVER FELT YOU SHOULD CUT DOWN ON YOUR DRINKING: NO
EVER HAD A DRINK FIRST THING IN THE MORNING TO STEADY YOUR NERVES TO GET RID OF A HANGOVER: NO
REASON UNABLE TO ASSESS: NO

## 2023-10-19 NOTE — ED PROVIDER NOTES
HPI   Chief Complaint   Patient presents with    Chest Pain     Cp for the past hour. Pt described it as epigastric, radiating to the jaw, and left arm. Pt also endorses nausea, and HA.         Patient is a healthy nontoxic-appearing 68-year-old female with past medical history of hypothyroidism, hypertension, COPD, chronic back pain, depression with anxiety, mixed hyperlipidemia, tachycardia, type 2 diabetes, obstructive sleep apnea on CPAP, stage III chronic kidney disease, anxiety, osteoporosis, esophageal reflux, presents to the emergency room today for complaint of chest pain.  Patient states earlier today she developed chest pain in the center of the chest with radiation into the left jaw and arm.  Patient denies any alleviating or worsening factors and states pain has been intermittent however has been improving.  Patient complains intermittent nausea and mild shortness of breath is worse with activity and improved at rest.  Patient denies any abdominal pain, vomiting, diarrhea or constipation.  Patient denies any fever, shaking, or chills.                          No data recorded                Patient History   Past Medical History:   Diagnosis Date    Personal history of other medical treatment     History of echocardiogram    Personal history of other medical treatment     History of cardiac monitoring    Personal history of other medical treatment     History of nuclear stress test    Uncontrolled type 2 diabetes mellitus with hyperglycemia (CMS/formerly [facility]) 04/03/2023    Urinary tract infection, site not specified 05/23/2022    Acute UTI     Past Surgical History:   Procedure Laterality Date    CHOLECYSTECTOMY  12/01/2017    Cholecystectomy    TONSILLECTOMY       Family History   Problem Relation Name Age of Onset    Aortic aneurysm Mother      Coronary artery disease Mother      Cerebral aneurysm Father      Emphysema Father       Social History     Tobacco Use    Smoking status: Every Day     Packs/day: 0.25      Years: 40.00     Additional pack years: 0.00     Total pack years: 10.00     Types: Cigarettes    Smokeless tobacco: Never   Vaping Use    Vaping Use: Never used   Substance Use Topics    Alcohol use: Not Currently    Drug use: Never       Physical Exam   ED Triage Vitals [10/18/23 2136]   Temp Heart Rate Resp BP   36.5 °C (97.7 °F) 85 18 (!) 212/93      SpO2 Temp Source Heart Rate Source Patient Position   96 % Temporal -- Sitting      BP Location FiO2 (%)     Right arm --       Physical Exam  Vitals and nursing note reviewed. Exam conducted with a chaperone present.   Constitutional:       General: She is not in acute distress.     Appearance: Normal appearance. She is well-developed. She is not ill-appearing, toxic-appearing or diaphoretic.   HENT:      Head: Normocephalic.      Right Ear: Tympanic membrane, ear canal and external ear normal.      Left Ear: Tympanic membrane, ear canal and external ear normal.      Nose: Nose normal.      Mouth/Throat:      Mouth: Mucous membranes are moist.      Pharynx: No oropharyngeal exudate or posterior oropharyngeal erythema.   Eyes:      General:         Right eye: No discharge.         Left eye: No discharge.      Extraocular Movements: Extraocular movements intact.      Pupils: Pupils are equal, round, and reactive to light.   Neck:      Vascular: No carotid bruit or JVD.   Cardiovascular:      Rate and Rhythm: Normal rate and regular rhythm.      Pulses: Normal pulses.      Heart sounds: Normal heart sounds. Heart sounds not distant. No murmur heard.     No systolic murmur is present.      No friction rub. No gallop.   Pulmonary:      Effort: Pulmonary effort is normal. No respiratory distress.      Breath sounds: Normal breath sounds. No stridor. No decreased breath sounds, wheezing, rhonchi or rales.   Chest:      Chest wall: No tenderness.   Abdominal:      General: Abdomen is flat.      Palpations: Abdomen is soft.   Musculoskeletal:         General: Normal  range of motion.      Cervical back: Normal range of motion and neck supple. No rigidity or tenderness.   Lymphadenopathy:      Cervical: No cervical adenopathy.   Skin:     General: Skin is warm and dry.      Capillary Refill: Capillary refill takes less than 2 seconds.   Neurological:      General: No focal deficit present.      Mental Status: She is alert and oriented to person, place, and time.         ED Course & MDM        Medical Decision Making  Given patient's complaint presentation a thorough exam was performed.  Cardiac evaluation was entered.  Patient states chest pain has improved since arrival in the emergency room.  Patient states chest pain does have radiation into the left jaw and arm.  Patient complains of mild shortness of breath with exertion and improves with rest. EKG was performed and reveals normal sinus rhythm rate of 79 bpm with no ST elevation or T wave inversion and is similar in comparison to previous EKG.        Procedure  Procedures     PORTILLO Simpson  10/19/23 0213       PORTILLO Simpson  10/19/23 0221       PORTILLO Simpson  10/19/23 0334

## 2023-10-26 ENCOUNTER — OFFICE VISIT (OUTPATIENT)
Dept: PRIMARY CARE | Facility: CLINIC | Age: 68
End: 2023-10-26
Payer: COMMERCIAL

## 2023-10-26 VITALS
BODY MASS INDEX: 28.16 KG/M2 | DIASTOLIC BLOOD PRESSURE: 70 MMHG | HEART RATE: 104 BPM | SYSTOLIC BLOOD PRESSURE: 136 MMHG | WEIGHT: 169.2 LBS

## 2023-10-26 DIAGNOSIS — F41.8 DEPRESSION WITH ANXIETY: ICD-10-CM

## 2023-10-26 DIAGNOSIS — J44.9 CHRONIC OBSTRUCTIVE PULMONARY DISEASE, UNSPECIFIED COPD TYPE (MULTI): ICD-10-CM

## 2023-10-26 DIAGNOSIS — R11.0 NAUSEA: ICD-10-CM

## 2023-10-26 DIAGNOSIS — G47.33 OSA ON CPAP: ICD-10-CM

## 2023-10-26 DIAGNOSIS — E11.9 TYPE 2 DIABETES MELLITUS WITHOUT COMPLICATION, WITHOUT LONG-TERM CURRENT USE OF INSULIN (MULTI): ICD-10-CM

## 2023-10-26 DIAGNOSIS — N18.32 STAGE 3B CHRONIC KIDNEY DISEASE (MULTI): Primary | ICD-10-CM

## 2023-10-26 DIAGNOSIS — G31.84 MILD COGNITIVE IMPAIRMENT WITH MEMORY LOSS: ICD-10-CM

## 2023-10-26 DIAGNOSIS — R07.89 ATYPICAL CHEST PAIN: ICD-10-CM

## 2023-10-26 DIAGNOSIS — R51.9 DAILY HEADACHE: ICD-10-CM

## 2023-10-26 DIAGNOSIS — I10 BENIGN ESSENTIAL HYPERTENSION: ICD-10-CM

## 2023-10-26 DIAGNOSIS — Z23 NEED FOR INFLUENZA VACCINATION: ICD-10-CM

## 2023-10-26 DIAGNOSIS — R79.89 ABNORMAL TSH: ICD-10-CM

## 2023-10-26 PROCEDURE — 1159F MED LIST DOCD IN RCRD: CPT | Performed by: INTERNAL MEDICINE

## 2023-10-26 PROCEDURE — 4004F PT TOBACCO SCREEN RCVD TLK: CPT | Performed by: INTERNAL MEDICINE

## 2023-10-26 PROCEDURE — 99214 OFFICE O/P EST MOD 30 MIN: CPT | Performed by: INTERNAL MEDICINE

## 2023-10-26 PROCEDURE — 90471 IMMUNIZATION ADMIN: CPT | Performed by: INTERNAL MEDICINE

## 2023-10-26 PROCEDURE — 36415 COLL VENOUS BLD VENIPUNCTURE: CPT

## 2023-10-26 PROCEDURE — 1125F AMNT PAIN NOTED PAIN PRSNT: CPT | Performed by: INTERNAL MEDICINE

## 2023-10-26 PROCEDURE — 83036 HEMOGLOBIN GLYCOSYLATED A1C: CPT

## 2023-10-26 PROCEDURE — 84443 ASSAY THYROID STIM HORMONE: CPT

## 2023-10-26 PROCEDURE — 3078F DIAST BP <80 MM HG: CPT | Performed by: INTERNAL MEDICINE

## 2023-10-26 PROCEDURE — 3044F HG A1C LEVEL LT 7.0%: CPT | Performed by: INTERNAL MEDICINE

## 2023-10-26 PROCEDURE — 3075F SYST BP GE 130 - 139MM HG: CPT | Performed by: INTERNAL MEDICINE

## 2023-10-26 PROCEDURE — 1160F RVW MEDS BY RX/DR IN RCRD: CPT | Performed by: INTERNAL MEDICINE

## 2023-10-26 PROCEDURE — 80048 BASIC METABOLIC PNL TOTAL CA: CPT

## 2023-10-26 PROCEDURE — 90662 IIV NO PRSV INCREASED AG IM: CPT | Performed by: INTERNAL MEDICINE

## 2023-10-26 RX ORDER — HYDROCHLOROTHIAZIDE 25 MG/1
25 TABLET ORAL DAILY
COMMUNITY
End: 2024-01-02 | Stop reason: WASHOUT

## 2023-10-26 RX ORDER — PANTOPRAZOLE SODIUM 40 MG/1
40 TABLET, DELAYED RELEASE ORAL DAILY
Qty: 90 TABLET | Refills: 0 | Status: SHIPPED | OUTPATIENT
Start: 2023-10-26 | End: 2024-03-14

## 2023-10-26 ASSESSMENT — ENCOUNTER SYMPTOMS
UNEXPECTED WEIGHT CHANGE: 0
RECTAL PAIN: 0
COUGH: 0
CONSTIPATION: 0
APPETITE CHANGE: 0
ABDOMINAL DISTENTION: 0
CONFUSION: 1
PALPITATIONS: 0
SHORTNESS OF BREATH: 0
CHEST TIGHTNESS: 1
DIZZINESS: 0
BLOOD IN STOOL: 0
ARTHRALGIAS: 0
DIFFICULTY URINATING: 0
FEVER: 0
LIGHT-HEADEDNESS: 0
FATIGUE: 0
ABDOMINAL PAIN: 0
ACTIVITY CHANGE: 0
VOMITING: 0
DIARRHEA: 0
HEADACHES: 1
CHILLS: 0
WHEEZING: 0
NAUSEA: 1
ANAL BLEEDING: 0

## 2023-10-26 ASSESSMENT — MINI MENTAL STATE EXAM
PLACE DESIGN, ERASER AND PENCIL IN FRONT OF THE PERSON.  SAY:  COPY THIS DESIGN PLEASE.  SHOW: DESIGN. ALLOW: MULTIPLE TRIES. WAIT UNTIL PERSON IS FINISHED AND HANDS IT BACK. SCORE: ONLY FOR DIAGRAM WITH 4-SIDED FIGURE BETWEEN TWO 5-SIDED FIGURES: 1 CORRECT
SUM ALL MMSE QUESTIONS FOR TOTAL SCORE [OUT OF 30].: 30
SAY:  READ THE WORDS ON THE PAGE AND THEN DO WHAT IT SAYS.  THEN HAND THE PERSON THE SHEET WITH CLOSE YOUR EYES ON IT.  IF THE SUBJECT READS AND DOES NOT CLOSE THEIR EYES, REPEAT UP TO THREE TIMES.  SCORE ONLY IF SUBJECT CLOSES EYES.: 3 CORRECT
WHAT IS THE YEAR, SEASON, DATE, DAY, AND MONTH: 5 CORRECT
SAY: I WOULD LIKE YOU TO REPEAT THIS PHRASE AFTER ME: NO IFS, ANDS, OR BUTS.: 1 CORRECT
RECALL THE 3 OBJECTS FROM ABOVE (APPLE, TABLE, PENNY) OR (BALL, TREE, FLAG): 3 CORRECT
NAME OR REPEAT 3 OBJECTS - (APPLE, TABLE, PENNY) OR (BALL, TREE, FLAG): 3 CORRECT
PLEASE COPY THIS PICTURE (NOTE ALL 10 ANGLES MUST BE PRESENT AND TWO MUST INTERSECT): 1 CORRECT
WHAT STATE, COUNTRY, CITY, HOSPITAL, FLOOR: 5 CORRECT
SPELL THE WORD WORLD FORWARD AND BACKWARDS OR SERIAL 7S: 5 CORRECT
HAND THE PERSON A PENCIL AND PAPER. SAY:  WRITE ANY COMPLETE SENTENCE ON THAT PIECE OF PAPER. (NOTE: THE SENTENCE MUST MAKE SENSE.  IGNORE SPELLING ERRORS): 1 CORRECT
SHOW: PENCIL [OBJECT] ASK: WHAT IS THIS CALLED?: 2 CORRECT

## 2023-10-26 NOTE — PROGRESS NOTES
Subjective   Patient ID: Maria Elena Mendez is a 68 y.o. female who presents for Follow-up (3m).    HPI  Pt here in follow up.  She tells me her memory is still a bit off where she will have days were she cannot find the words she wants to use.  Some days are ok.      She is on Levothyroxine 88 mcg and takes it 5 days only a week.  Her tsh in July was noted to be too low.      She was just in the ER on 10/19 due to chest pain/epigastric in nature.  She tells me it was intense and radiated up into her shoulders and jaw.  She went and they did EKG and troponins that were normal.  She tells me her BP was high when in ER.  She tells me she continues to have some heaviness in chest along with dizziness and headaches.  She returned to taking hydrochlorothiazide 25 mg in addition to her Metoprolol.      She continues to have horrible headaches and nausea.  Per patient this has been going on for 3+ months but seems consistent.  She is taking daily ibuprofen.      Review of Systems   Constitutional:  Negative for activity change, appetite change, chills, fatigue, fever and unexpected weight change.   Respiratory:  Positive for chest tightness. Negative for cough, shortness of breath and wheezing.    Cardiovascular:  Positive for chest pain. Negative for palpitations and leg swelling.   Gastrointestinal:  Positive for nausea. Negative for abdominal distention, abdominal pain, anal bleeding, blood in stool, constipation, diarrhea, rectal pain and vomiting.   Genitourinary:  Negative for difficulty urinating.   Musculoskeletal:  Negative for arthralgias.   Neurological:  Positive for headaches. Negative for dizziness and light-headedness.   Psychiatric/Behavioral:  Positive for confusion.        Objective   /70 (BP Location: Left arm, Patient Position: Sitting)   Pulse 104   Wt 76.7 kg (169 lb 3.2 oz)   BMI 28.16 kg/m²    Physical Exam  Constitutional:       Appearance: Normal appearance.   Cardiovascular:      Rate and  Rhythm: Normal rate and regular rhythm.      Pulses: Normal pulses.      Heart sounds: Normal heart sounds.   Pulmonary:      Effort: Pulmonary effort is normal.      Breath sounds: Normal breath sounds.   Abdominal:      General: Abdomen is flat. Bowel sounds are normal.      Palpations: Abdomen is soft.      Tenderness: There is abdominal tenderness (epigastric discomfort noted).   Musculoskeletal:         General: Normal range of motion.      Right lower leg: No edema.      Left lower leg: No edema.   Lymphadenopathy:      Cervical: No cervical adenopathy.   Neurological:      Mental Status: She is alert and oriented to person, place, and time.   Psychiatric:         Mood and Affect: Mood normal.         Assessment/Plan   Problem List Items Addressed This Visit       Benign essential hypertension     Pt back on hydrochlorothiazide since ER visit 1 week ago  Will repeat BMP at creatine noted to be reduced  BP stable today and pt didn't take hydrochlorothiazide          Relevant Orders    Basic Metabolic Panel    COPD (chronic obstructive pulmonary disease) (CMS/HCC)     Stable          Depression with anxiety    Mild cognitive impairment with memory loss     Pt scored 30/30 on MMSE  No changes noted since last time  Will continue to monitor  Encourage brain stimulating games and socialization to help maintain cognition          Type 2 diabetes mellitus without complication, without long-term current use of insulin (CMS/[facility])     On meds  Repeat A1c today          Relevant Orders    Hemoglobin A1C    FLORIN on CPAP    Stage 3b chronic kidney disease (CMS/[facility]) - Primary    Relevant Orders    Basic Metabolic Panel    Atypical chest pain     Pt with chest pain/pressure as well as nausea in AM and mucous sensation in throat-consistent with GERD  Will trial PPI therapy daily at dinner/evening for next 6-8 weeks-rx provided  Will follow up with pt in 6 weeks to see if improved          Relevant Medications    pantoprazole  (ProtoNix) 40 mg EC tablet    Other Relevant Orders    Follow Up In Primary Care - Established     Other Visit Diagnoses       Daily headache        Relevant Orders    Follow Up In Primary Care - Established    Abnormal TSH        Relevant Orders    TSH    Need for influenza vaccination        Relevant Orders    Flu vaccine, quadrivalent, high-dose, preservative free, age 65y+ (FLUZONE) (Completed)    Nausea        Relevant Orders    Follow Up In Primary Care - Established

## 2023-10-26 NOTE — ASSESSMENT & PLAN NOTE
Pt with chest pain/pressure as well as nausea in AM and mucous sensation in throat-consistent with GERD  Will trial PPI therapy daily at dinner/evening for next 6-8 weeks-rx provided  Will follow up with pt in 6 weeks to see if improved

## 2023-10-26 NOTE — ASSESSMENT & PLAN NOTE
Pt scored 30/30 on MMSE  No changes noted since last time  Will continue to monitor  Encourage brain stimulating games and socialization to help maintain cognition

## 2023-10-26 NOTE — PATIENT INSTRUCTIONS
Start and take once a day at dinner or just after Pantoprazole 40 mg (this will help gastritis which is likely causing the chest pain/mucous sensation in throat/cough and nausea)  Continue all other medications as directed  You got your flu shot today; consider covid booster  Your memory exam was normal at this time; we will continue to monitor for changes  Follow up in 6 weeks

## 2023-10-27 LAB
ANION GAP SERPL CALC-SCNC: 17 MMOL/L (ref 10–20)
BUN SERPL-MCNC: 36 MG/DL (ref 6–23)
CALCIUM SERPL-MCNC: 10.3 MG/DL (ref 8.6–10.6)
CHLORIDE SERPL-SCNC: 102 MMOL/L (ref 98–107)
CO2 SERPL-SCNC: 22 MMOL/L (ref 21–32)
CREAT SERPL-MCNC: 1.35 MG/DL (ref 0.5–1.05)
EST. AVERAGE GLUCOSE BLD GHB EST-MCNC: 131 MG/DL
GFR SERPL CREATININE-BSD FRML MDRD: 43 ML/MIN/1.73M*2
GLUCOSE SERPL-MCNC: 99 MG/DL (ref 74–99)
HBA1C MFR BLD: 6.2 %
POTASSIUM SERPL-SCNC: 4 MMOL/L (ref 3.5–5.3)
SODIUM SERPL-SCNC: 137 MMOL/L (ref 136–145)
TSH SERPL-ACNC: 1.94 MIU/L (ref 0.44–3.98)

## 2023-11-09 PROBLEM — E03.9 HYPOTHYROIDISM: Status: ACTIVE | Noted: 2019-09-24

## 2023-11-27 DIAGNOSIS — E78.2 MIXED HYPERLIPIDEMIA: ICD-10-CM

## 2023-11-27 DIAGNOSIS — I10 ESSENTIAL (PRIMARY) HYPERTENSION: ICD-10-CM

## 2023-11-30 RX ORDER — METOPROLOL SUCCINATE 25 MG/1
TABLET, EXTENDED RELEASE ORAL DAILY
Qty: 45 TABLET | Refills: 3 | Status: SHIPPED | OUTPATIENT
Start: 2023-11-30

## 2023-11-30 RX ORDER — ROSUVASTATIN CALCIUM 40 MG/1
40 TABLET, COATED ORAL DAILY
Qty: 90 TABLET | Refills: 3 | Status: SHIPPED | OUTPATIENT
Start: 2023-11-30

## 2023-12-04 ENCOUNTER — APPOINTMENT (OUTPATIENT)
Dept: PRIMARY CARE | Facility: CLINIC | Age: 68
End: 2023-12-04
Payer: COMMERCIAL

## 2023-12-19 ENCOUNTER — HOSPITAL ENCOUNTER (OUTPATIENT)
Dept: CARDIOLOGY | Facility: HOSPITAL | Age: 68
Discharge: HOME | End: 2023-12-19
Payer: COMMERCIAL

## 2023-12-19 LAB
ATRIAL RATE: 79 BPM
P AXIS: 69 DEGREES
P OFFSET: 202 MS
P ONSET: 150 MS
PR INTERVAL: 152 MS
Q ONSET: 226 MS
QRS COUNT: 13 BEATS
QRS DURATION: 74 MS
QT INTERVAL: 362 MS
QTC CALCULATION(BAZETT): 415 MS
QTC FREDERICIA: 396 MS
R AXIS: 75 DEGREES
T AXIS: 2 DEGREES
T OFFSET: 407 MS
VENTRICULAR RATE: 79 BPM

## 2023-12-19 PROCEDURE — 93005 ELECTROCARDIOGRAM TRACING: CPT

## 2024-01-02 ENCOUNTER — OFFICE VISIT (OUTPATIENT)
Dept: PRIMARY CARE | Facility: CLINIC | Age: 69
End: 2024-01-02
Payer: COMMERCIAL

## 2024-01-02 VITALS
SYSTOLIC BLOOD PRESSURE: 138 MMHG | DIASTOLIC BLOOD PRESSURE: 72 MMHG | WEIGHT: 171.6 LBS | BODY MASS INDEX: 28.56 KG/M2 | HEART RATE: 100 BPM

## 2024-01-02 DIAGNOSIS — G31.84 MILD COGNITIVE IMPAIRMENT WITH MEMORY LOSS: ICD-10-CM

## 2024-01-02 DIAGNOSIS — K21.9 GASTROESOPHAGEAL REFLUX DISEASE WITHOUT ESOPHAGITIS: ICD-10-CM

## 2024-01-02 DIAGNOSIS — I10 BENIGN ESSENTIAL HYPERTENSION: ICD-10-CM

## 2024-01-02 DIAGNOSIS — N18.32 STAGE 3B CHRONIC KIDNEY DISEASE (MULTI): ICD-10-CM

## 2024-01-02 DIAGNOSIS — J44.9 CHRONIC OBSTRUCTIVE PULMONARY DISEASE, UNSPECIFIED COPD TYPE (MULTI): ICD-10-CM

## 2024-01-02 DIAGNOSIS — R51.9 DAILY HEADACHE: ICD-10-CM

## 2024-01-02 DIAGNOSIS — Z72.0 TOBACCO ABUSE: Primary | ICD-10-CM

## 2024-01-02 DIAGNOSIS — E11.9 TYPE 2 DIABETES MELLITUS WITHOUT COMPLICATION, WITHOUT LONG-TERM CURRENT USE OF INSULIN (MULTI): ICD-10-CM

## 2024-01-02 PROBLEM — R07.89 ATYPICAL CHEST PAIN: Status: RESOLVED | Noted: 2023-10-26 | Resolved: 2024-01-02

## 2024-01-02 PROBLEM — E03.9 ACQUIRED HYPOTHYROIDISM: Status: RESOLVED | Noted: 2023-04-03 | Resolved: 2024-01-02

## 2024-01-02 PROCEDURE — 1159F MED LIST DOCD IN RCRD: CPT | Performed by: INTERNAL MEDICINE

## 2024-01-02 PROCEDURE — 1125F AMNT PAIN NOTED PAIN PRSNT: CPT | Performed by: INTERNAL MEDICINE

## 2024-01-02 PROCEDURE — 99214 OFFICE O/P EST MOD 30 MIN: CPT | Performed by: INTERNAL MEDICINE

## 2024-01-02 PROCEDURE — 3075F SYST BP GE 130 - 139MM HG: CPT | Performed by: INTERNAL MEDICINE

## 2024-01-02 PROCEDURE — 1160F RVW MEDS BY RX/DR IN RCRD: CPT | Performed by: INTERNAL MEDICINE

## 2024-01-02 PROCEDURE — 3078F DIAST BP <80 MM HG: CPT | Performed by: INTERNAL MEDICINE

## 2024-01-02 RX ORDER — CHOLINE 250 MG
TABLET ORAL
COMMUNITY
Start: 2004-01-30

## 2024-01-02 RX ORDER — VIT C/E/ZN/COPPR/LUTEIN/ZEAXAN 250MG-90MG
CAPSULE ORAL
COMMUNITY

## 2024-01-02 RX ORDER — LANOLIN ALCOHOL/MO/W.PET/CERES
CREAM (GRAM) TOPICAL
COMMUNITY

## 2024-01-02 ASSESSMENT — ENCOUNTER SYMPTOMS
ABDOMINAL DISTENTION: 0
DIARRHEA: 0
UNEXPECTED WEIGHT CHANGE: 0
NAUSEA: 0
ANAL BLEEDING: 0
STRIDOR: 0
HEADACHES: 0
APPETITE CHANGE: 0
CHOKING: 0
PALPITATIONS: 0
DIFFICULTY URINATING: 0
DYSURIA: 0
WHEEZING: 0
FLANK PAIN: 0
LIGHT-HEADEDNESS: 0
SHORTNESS OF BREATH: 0
BLOOD IN STOOL: 0
COUGH: 1
ABDOMINAL PAIN: 0
VOMITING: 0
DIZZINESS: 0
APNEA: 0
RECTAL PAIN: 0
ADENOPATHY: 0
CONSTIPATION: 0
FEVER: 0
FATIGUE: 0
CHEST TIGHTNESS: 0
ACTIVITY CHANGE: 0
CHILLS: 0

## 2024-01-02 NOTE — ASSESSMENT & PLAN NOTE
No current issues   Encouraged tobacco cessation  Did order ct low dose lung screening-she will consider having this done once she returns from [place] in March

## 2024-01-02 NOTE — ASSESSMENT & PLAN NOTE
Off hydrochlorothiazide at this time  Adequate control; can continue home BP checks with goal <140/90

## 2024-01-02 NOTE — PROGRESS NOTES
Subjective   Patient ID: Maria Elena Mendez is a 68 y.o. female who presents for Follow-up.    HPI    Pt here for follow up.  She was started on Pantoprazole back in late 10/2023 due to cough and nausea.  She feels the nausea has improved but the cough still present although better.  She continues to smoke-5 cigs/day.  She hasn't had CT low dose chest ever.      She continues off hydrochlorothiazide and her BP is stable.  Her Creatine is still reduced with GFR to 43.      Her A1C was 6.2% in 10/2023.  Her weight is similar to last visit.  She is on Metformin.      She is no longer having daily headaches but now just occasional.  She feels her memory has improved as well.      Review of Systems   Constitutional:  Negative for activity change, appetite change, chills, fatigue, fever and unexpected weight change.   Respiratory:  Positive for cough. Negative for apnea, choking, chest tightness, shortness of breath, wheezing and stridor.    Cardiovascular:  Negative for chest pain, palpitations and leg swelling.   Gastrointestinal:  Negative for abdominal distention, abdominal pain, anal bleeding, blood in stool, constipation, diarrhea, nausea, rectal pain and vomiting.   Genitourinary:  Negative for difficulty urinating, dysuria and flank pain.   Neurological:  Negative for dizziness, light-headedness and headaches.   Hematological:  Negative for adenopathy.       Objective   /72 (BP Location: Left arm, Patient Position: Sitting)   Pulse 100   Wt 77.8 kg (171 lb 9.6 oz)   BMI 28.56 kg/m²    Physical Exam  Constitutional:       Appearance: Normal appearance.   Cardiovascular:      Rate and Rhythm: Normal rate and regular rhythm.      Heart sounds: Normal heart sounds.   Pulmonary:      Effort: Pulmonary effort is normal.      Breath sounds: Normal breath sounds.   Abdominal:      General: Bowel sounds are normal.      Palpations: Abdomen is soft.   Musculoskeletal:      Right lower leg: No edema.      Left lower  leg: No edema.   Neurological:      Mental Status: She is alert and oriented to person, place, and time.   Psychiatric:         Mood and Affect: Mood normal.         Assessment/Plan   Problem List Items Addressed This Visit       Benign essential hypertension     Off hydrochlorothiazide at this time  Adequate control; can continue home BP checks with goal <140/90          COPD (chronic obstructive pulmonary disease) (CMS/[facility])     No current issues   Encouraged tobacco cessation  Did order ct low dose lung screening-she will consider having this done once she returns from [place] in March         Relevant Orders    Follow Up In Primary Care - Health Maintenance    Mild cognitive impairment with memory loss     Improved per patient          Type 2 diabetes mellitus without complication, without long-term current use of insulin (CMS/[facility])     A1C <7  On Metformin  Diet modifications          Relevant Orders    Follow Up In Primary Care - Health Maintenance    Stage 3b chronic kidney disease (CMS/[facility])    Relevant Orders    Follow Up In Primary Care - Health Maintenance    Esophageal reflux     Improved since starting PPI therapy  Not having nausea anymore         Relevant Orders    Follow Up In Primary Care - Health Maintenance     Other Visit Diagnoses       Tobacco abuse    -  Primary    Relevant Orders    CT lung screening low dose    Daily headache

## 2024-01-02 NOTE — PATIENT INSTRUCTIONS
Remain off the hydrochlorothiazide but watch BP at home-if you consistently see BP >140/90 then restart use or call to get refill  Continue daily use of Pantoprazole-call when refills needed  Please call and schedule CT scan of lungs-this will check for worsening COPD/emphysema and/or lung nodules due to continued smoking and cough  Continue healthy diet and exercise/be as active as able  Work to stop smoking all together  Follow up here in 3 months for full physical (come fasting for blood work/urine evaluation)

## 2024-01-17 DIAGNOSIS — I10 ESSENTIAL (PRIMARY) HYPERTENSION: ICD-10-CM

## 2024-01-18 RX ORDER — HYDROCHLOROTHIAZIDE 25 MG/1
25 TABLET ORAL DAILY
Qty: 90 TABLET | Refills: 3 | Status: SHIPPED | OUTPATIENT
Start: 2024-01-18

## 2024-03-13 DIAGNOSIS — E03.9 HYPOTHYROIDISM, UNSPECIFIED: ICD-10-CM

## 2024-03-13 DIAGNOSIS — R07.89 ATYPICAL CHEST PAIN: ICD-10-CM

## 2024-03-14 RX ORDER — LEVOTHYROXINE SODIUM 88 UG/1
88 TABLET ORAL DAILY
Qty: 90 TABLET | Refills: 1 | Status: SHIPPED | OUTPATIENT
Start: 2024-03-14

## 2024-03-14 RX ORDER — PANTOPRAZOLE SODIUM 40 MG/1
40 TABLET, DELAYED RELEASE ORAL DAILY
Qty: 90 TABLET | Refills: 1 | Status: SHIPPED | OUTPATIENT
Start: 2024-03-14 | End: 2024-09-10

## 2024-04-08 ENCOUNTER — APPOINTMENT (OUTPATIENT)
Dept: PRIMARY CARE | Facility: CLINIC | Age: 69
End: 2024-04-08
Payer: MEDICARE

## 2024-04-10 DIAGNOSIS — E11.65 UNCONTROLLED TYPE 2 DIABETES MELLITUS WITH HYPERGLYCEMIA (MULTI): ICD-10-CM

## 2024-04-10 RX ORDER — METFORMIN HYDROCHLORIDE 500 MG/1
500 TABLET ORAL 2 TIMES DAILY
Qty: 180 TABLET | Refills: 1 | Status: SHIPPED | OUTPATIENT
Start: 2024-04-10

## 2024-05-06 ENCOUNTER — APPOINTMENT (OUTPATIENT)
Dept: RADIOLOGY | Facility: CLINIC | Age: 69
End: 2024-05-06
Payer: MEDICARE

## 2024-06-07 ENCOUNTER — APPOINTMENT (OUTPATIENT)
Dept: PRIMARY CARE | Facility: CLINIC | Age: 69
End: 2024-06-07
Payer: MEDICARE

## 2024-06-10 DIAGNOSIS — F41.8 DEPRESSION WITH ANXIETY: ICD-10-CM

## 2024-06-10 RX ORDER — VENLAFAXINE HYDROCHLORIDE 75 MG/1
75 CAPSULE, EXTENDED RELEASE ORAL DAILY
Qty: 90 CAPSULE | Refills: 3 | Status: SHIPPED | OUTPATIENT
Start: 2024-06-10

## 2024-10-04 DIAGNOSIS — E03.9 HYPOTHYROIDISM, UNSPECIFIED: ICD-10-CM

## 2024-10-04 DIAGNOSIS — R07.89 ATYPICAL CHEST PAIN: ICD-10-CM

## 2024-10-04 DIAGNOSIS — E11.65 UNCONTROLLED TYPE 2 DIABETES MELLITUS WITH HYPERGLYCEMIA: ICD-10-CM

## 2024-10-04 RX ORDER — METFORMIN HYDROCHLORIDE 500 MG/1
500 TABLET ORAL 2 TIMES DAILY
Qty: 180 TABLET | Refills: 1 | Status: SHIPPED | OUTPATIENT
Start: 2024-10-04

## 2024-10-04 RX ORDER — PANTOPRAZOLE SODIUM 40 MG/1
40 TABLET, DELAYED RELEASE ORAL DAILY
Qty: 90 TABLET | Refills: 1 | Status: SHIPPED | OUTPATIENT
Start: 2024-10-04 | End: 2025-04-02

## 2024-10-04 RX ORDER — LEVOTHYROXINE SODIUM 88 UG/1
88 TABLET ORAL DAILY
Qty: 90 TABLET | Refills: 1 | Status: SHIPPED | OUTPATIENT
Start: 2024-10-04

## 2024-10-28 ENCOUNTER — APPOINTMENT (OUTPATIENT)
Dept: PRIMARY CARE | Facility: CLINIC | Age: 69
End: 2024-10-28
Payer: MEDICARE

## 2024-11-12 ENCOUNTER — OFFICE VISIT (OUTPATIENT)
Dept: ORTHOPEDIC SURGERY | Facility: CLINIC | Age: 69
End: 2024-11-12
Payer: MEDICARE

## 2024-11-12 ENCOUNTER — HOSPITAL ENCOUNTER (OUTPATIENT)
Dept: RADIOLOGY | Facility: CLINIC | Age: 69
Discharge: HOME | End: 2024-11-12
Payer: MEDICARE

## 2024-11-12 VITALS — WEIGHT: 160 LBS | HEIGHT: 66 IN | BODY MASS INDEX: 25.71 KG/M2

## 2024-11-12 DIAGNOSIS — M25.512 ACUTE PAIN OF LEFT SHOULDER: ICD-10-CM

## 2024-11-12 DIAGNOSIS — M75.42 ROTATOR CUFF IMPINGEMENT SYNDROME OF LEFT SHOULDER: Primary | ICD-10-CM

## 2024-11-12 PROCEDURE — 3008F BODY MASS INDEX DOCD: CPT | Performed by: FAMILY MEDICINE

## 2024-11-12 PROCEDURE — 1160F RVW MEDS BY RX/DR IN RCRD: CPT | Performed by: FAMILY MEDICINE

## 2024-11-12 PROCEDURE — 73030 X-RAY EXAM OF SHOULDER: CPT | Mod: LEFT SIDE | Performed by: RADIOLOGY

## 2024-11-12 PROCEDURE — 73030 X-RAY EXAM OF SHOULDER: CPT | Mod: LT

## 2024-11-12 PROCEDURE — 20610 DRAIN/INJ JOINT/BURSA W/O US: CPT | Performed by: FAMILY MEDICINE

## 2024-11-12 PROCEDURE — 1159F MED LIST DOCD IN RCRD: CPT | Performed by: FAMILY MEDICINE

## 2024-11-12 PROCEDURE — 99203 OFFICE O/P NEW LOW 30 MIN: CPT | Performed by: FAMILY MEDICINE

## 2024-11-12 RX ORDER — LIDOCAINE HYDROCHLORIDE 20 MG/ML
2 INJECTION, SOLUTION INFILTRATION; PERINEURAL
Status: COMPLETED | OUTPATIENT
Start: 2024-11-12 | End: 2024-11-12

## 2024-11-12 RX ORDER — TRIAMCINOLONE ACETONIDE 40 MG/ML
40 INJECTION, SUSPENSION INTRA-ARTICULAR; INTRAMUSCULAR
Status: COMPLETED | OUTPATIENT
Start: 2024-11-12 | End: 2024-11-12

## 2024-11-12 NOTE — PROGRESS NOTES
History of Present Illness   Chief Complaint   Patient presents with    Left Shoulder - Injury     Pt fell onto her left shoulder several months ago  +pain +lrom +sleep       The patient is 69 y.o. right-hand-dominant female  here with a complaint of left shoulder pain.  Onset of symptoms 11 months ago, says she fell onto her left shoulder in January of this year with some pain, says that she was dealing with some intermittent discomfort in her shoulder however over the past few months symptoms have become more consistent and more significant prompting her to schedule appointment with me today for further evaluation.  She admits to pain, limited range of motion in the shoulder, she admits to some popping and clicking, difficulty with overhead activity, reaching behind her back.  She has been using some over-the-counter medications for pain, she has some radiation of pain into the upper arm to above the elbow.  She denies any numbness or tingling.  She does have pain with sleep at night.  She denies any history of any left shoulder problems in the past.  She does have known rotator cuff tear of the right shoulder that was treated nonoperatively, she has some mild range of motion deficits of the right shoulder as well as some popping and clicking but she says pain is minimal in the right shoulder at this time.    Past Medical History:   Diagnosis Date    Personal history of other medical treatment     History of echocardiogram    Personal history of other medical treatment     History of cardiac monitoring    Personal history of other medical treatment     History of nuclear stress test    Uncontrolled type 2 diabetes mellitus with hyperglycemia (Multi) 04/03/2023    Urinary tract infection, site not specified 05/23/2022    Acute UTI       Medication Documentation Review Audit       Reviewed by Jahaira SEBASTIAN DO (Physician) on 01/02/24 at 1444      Medication Order Taking? Sig Documenting Provider Last Dose Status    calcium-mag oxide-vitamin D3 (Coral Calcium) 185- mg-mg-unit capsule [id number] Yes Take one(1) tablet daily. Historical Provider, MD Taking Active   cholecalciferol (Vitamin D-3) 25 MCG (1000 UT) capsule [id number] Yes Take by mouth. Historical Provider, MD Taking Active   cyanocobalamin (Vitamin B-12) 1,000 mcg tablet [id number] Yes Take by mouth. Historical Provider, MD Taking Active   Discontinued 01/02/24 1443   ibuprofen (Advil Migraine) capsule [id number] Yes Take 1 capsule (200 mg) by mouth. Historical Provider, MD Taking Active   Discontinued 01/02/24 1444   levothyroxine (Synthroid, Levoxyl) 88 mcg tablet [id number] Yes TAKE 1 TABLET BY MOUTH EVERY DAY Jahaira Dalal V, DO Taking Active   magnesium oxide 500 mg capsule [id number] Yes Take by mouth. Historical Provider, MD Taking Active   metFORMIN (Glucophage) 500 mg tablet [id number] Yes Take 1 tablet (500 mg) by mouth in the morning and 1 tablet (500 mg) before bedtime. Jahaira Dalal V, DO Taking Active   metoprolol succinate XL (Toprol-XL) 25 mg 24 hr tablet [id number] Yes TAKE ONE-HALF (1/2) TABLET DAILY Jahaira Dalal V, DO Taking Active   pantoprazole (ProtoNix) 40 mg EC tablet [id number] Yes Take 1 tablet (40 mg) by mouth once daily. Do not crush, chew, or split. Jahaira Dalal V, DO Taking Active   rosuvastatin (Crestor) 40 mg tablet [id number] Yes TAKE 1 TABLET BY MOUTH DAILY Jahaira Dalal V, DO Taking Active   Symbicort 160-4.5 mcg/actuation inhaler [id number] Yes Inhale 2 puffs 2 times a day. Rinse mouth after use. Jahaira Dalal V, DO Taking Active   venlafaxine XR (Effexor-XR) 75 mg 24 hr capsule [id number] Yes Take 1 capsule (75 mg) by mouth once daily. WITH FOOD Jahaira Dalal V, DO Taking Active                    Allergies   Allergen Reactions    Levofloxacin Unknown    Moxifloxacin Unknown       Social History     Socioeconomic History    Marital status:      Spouse name: David    Number of children: 2    Years of education: Not on file    Highest education level:  Not on file   Occupational History    Occupation: Retired   Tobacco Use    Smoking status: Every Day     Current packs/day: 0.25     Average packs/day: 0.3 packs/day for 40.0 years (10.0 ttl pk-yrs)     Types: Cigarettes    Smokeless tobacco: Never   Vaping Use    Vaping status: Never Used   Substance and Sexual Activity    Alcohol use: Not Currently    Drug use: Never    Sexual activity: Not Currently   Other Topics Concern    Not on file   Social History Narrative    Not on file     Social Drivers of Health     Financial Resource Strain: Not on file   Food Insecurity: Not on file   Transportation Needs: Not on file   Physical Activity: Not on file   Stress: Not on file   Social Connections: Not on file   Intimate Partner Violence: Not on file   Housing Stability: Not on file       Past Surgical History:   Procedure Laterality Date    CHOLECYSTECTOMY  12/01/2017    Cholecystectomy    TONSILLECTOMY            Review of Systems   GENERAL: Negative  GI: Negative  MUSCULOSKELETAL: See HPI  SKIN: Negative  NEURO:  Negative     Physical Exam:    General/Constitutional: well appearing, no distress, appears stated age  HEENT: sclera clear  Respiratory: non labored breathing  Vascular: No edema, swelling or tenderness, except as noted in detailed exam.  Integumentary: No impressive skin lesions present, except as noted in detailed exam.  Neurological:  Alert and oriented   Psychological:  Normal mood and affect.  Musculoskeletal: Normal, except as noted in detailed exam and in HPI      Left shoulder: Normal appearance, skin envelope is intact.  There is some tenderness at the anterior glenohumeral joint and subacromial space.  There is some tenderness in the trapezius and rhomboids posteriorly.  She has limited active range of motion, forward flexion and abduction to around 120 degrees, internal rotation lumbar spine, external rotation 20 degrees, passively I can get to around 160 degrees of forward flexion with pain.   Passive external rotation 30 degrees with pain.  Negative drop arm.  There is no significant weakness with rotator cuff strength testing but there is pain most notable with resisted abduction.  She does have pain with Ho and Neer's test.  Positive Assumption.  Negative Speed, negative Yergason     Imaging: X-rays of left shoulder obtained today and independently reviewed, no acute abnormalities are seen, there is some degenerative changes of the AC joint, no significant degenerative changes glenohumeral joint    L Inj/Asp: L subacromial bursa on 11/12/2024 3:37 PM  Indications: pain  Details: 22 G needle, posterior approach  Medications: 40 mg triamcinolone acetonide 40 mg/mL; 2 mL lidocaine 20 mg/mL (2 %)  Outcome: tolerated well, no immediate complications  Procedure, treatment alternatives, risks and benefits explained, specific risks discussed. Consent was given by the patient. Immediately prior to procedure a time out was called to verify the correct patient, procedure, equipment, support staff and site/side marked as required. Patient was prepped and draped in the usual sterile fashion.               Assessment   1. Rotator cuff impingement syndrome of left shoulder  Referral to Physical Therapy      2. Acute pain of left shoulder  XR shoulder left 2+ views    Referral to Physical Therapy            Plan: Discussed diagnosis, reviewed x-rays, further workup and treatment.  We did proceed with subacromial injection with Kenalog today, hopeful this will provide some relief, I do think she would benefit from some physical therapy, likely has developed some compensatory pain in the cervical spine and posterior shoulder.  Referral for PT was placed, she does plan to do [facility] in [place].  She is hopeful to be able to go to her daughter's house in [place] at the end of the year.  I would like to see her back in 1 month for reassessment

## 2024-11-22 ENCOUNTER — APPOINTMENT (OUTPATIENT)
Dept: PRIMARY CARE | Facility: CLINIC | Age: 69
End: 2024-11-22
Payer: MEDICARE

## 2024-11-22 ENCOUNTER — LAB (OUTPATIENT)
Dept: LAB | Facility: LAB | Age: 69
End: 2024-11-22
Payer: MEDICARE

## 2024-11-22 VITALS
SYSTOLIC BLOOD PRESSURE: 92 MMHG | RESPIRATION RATE: 17 BRPM | HEART RATE: 84 BPM | WEIGHT: 159.9 LBS | BODY MASS INDEX: 25.7 KG/M2 | DIASTOLIC BLOOD PRESSURE: 62 MMHG | HEIGHT: 66 IN

## 2024-11-22 DIAGNOSIS — N18.32 STAGE 3B CHRONIC KIDNEY DISEASE (MULTI): ICD-10-CM

## 2024-11-22 DIAGNOSIS — F41.8 DEPRESSION WITH ANXIETY: ICD-10-CM

## 2024-11-22 DIAGNOSIS — J44.9 CHRONIC OBSTRUCTIVE PULMONARY DISEASE, UNSPECIFIED COPD TYPE (MULTI): ICD-10-CM

## 2024-11-22 DIAGNOSIS — E03.9 ACQUIRED HYPOTHYROIDISM: ICD-10-CM

## 2024-11-22 DIAGNOSIS — E11.9 TYPE 2 DIABETES MELLITUS WITHOUT COMPLICATION, WITHOUT LONG-TERM CURRENT USE OF INSULIN (MULTI): ICD-10-CM

## 2024-11-22 DIAGNOSIS — R26.89 BALANCE DISORDER: ICD-10-CM

## 2024-11-22 DIAGNOSIS — E78.2 MIXED HYPERLIPIDEMIA: ICD-10-CM

## 2024-11-22 DIAGNOSIS — Z12.31 ENCOUNTER FOR SCREENING MAMMOGRAM FOR MALIGNANT NEOPLASM OF BREAST: ICD-10-CM

## 2024-11-22 DIAGNOSIS — Z12.12 SCREENING FOR COLORECTAL CANCER: Primary | ICD-10-CM

## 2024-11-22 DIAGNOSIS — M85.80 OSTEOPENIA, UNSPECIFIED LOCATION: ICD-10-CM

## 2024-11-22 DIAGNOSIS — E66.3 OVERWEIGHT WITH BODY MASS INDEX (BMI) OF 25 TO 25.9 IN ADULT: ICD-10-CM

## 2024-11-22 DIAGNOSIS — Z23 NEED FOR INFLUENZA VACCINATION: ICD-10-CM

## 2024-11-22 DIAGNOSIS — I10 BENIGN ESSENTIAL HYPERTENSION: ICD-10-CM

## 2024-11-22 DIAGNOSIS — Z12.11 SCREENING FOR COLORECTAL CANCER: Primary | ICD-10-CM

## 2024-11-22 DIAGNOSIS — G63 POLYNEUROPATHY ASSOCIATED WITH UNDERLYING DISEASE (MULTI): ICD-10-CM

## 2024-11-22 DIAGNOSIS — G47.33 OSA (OBSTRUCTIVE SLEEP APNEA): ICD-10-CM

## 2024-11-22 PROCEDURE — 1170F FXNL STATUS ASSESSED: CPT | Performed by: INTERNAL MEDICINE

## 2024-11-22 PROCEDURE — 3008F BODY MASS INDEX DOCD: CPT | Performed by: INTERNAL MEDICINE

## 2024-11-22 PROCEDURE — G0008 ADMIN INFLUENZA VIRUS VAC: HCPCS | Performed by: INTERNAL MEDICINE

## 2024-11-22 PROCEDURE — 84443 ASSAY THYROID STIM HORMONE: CPT

## 2024-11-22 PROCEDURE — 1159F MED LIST DOCD IN RCRD: CPT | Performed by: INTERNAL MEDICINE

## 2024-11-22 PROCEDURE — 80053 COMPREHEN METABOLIC PANEL: CPT

## 2024-11-22 PROCEDURE — 3078F DIAST BP <80 MM HG: CPT | Performed by: INTERNAL MEDICINE

## 2024-11-22 PROCEDURE — 82306 VITAMIN D 25 HYDROXY: CPT

## 2024-11-22 PROCEDURE — G0402 INITIAL PREVENTIVE EXAM: HCPCS | Performed by: INTERNAL MEDICINE

## 2024-11-22 PROCEDURE — 80061 LIPID PANEL: CPT

## 2024-11-22 PROCEDURE — 99397 PER PM REEVAL EST PAT 65+ YR: CPT | Performed by: INTERNAL MEDICINE

## 2024-11-22 PROCEDURE — 1158F ADVNC CARE PLAN TLK DOCD: CPT | Performed by: INTERNAL MEDICINE

## 2024-11-22 PROCEDURE — 83036 HEMOGLOBIN GLYCOSYLATED A1C: CPT

## 2024-11-22 PROCEDURE — 1123F ACP DISCUSS/DSCN MKR DOCD: CPT | Performed by: INTERNAL MEDICINE

## 2024-11-22 PROCEDURE — 90662 IIV NO PRSV INCREASED AG IM: CPT | Performed by: INTERNAL MEDICINE

## 2024-11-22 PROCEDURE — 1160F RVW MEDS BY RX/DR IN RCRD: CPT | Performed by: INTERNAL MEDICINE

## 2024-11-22 PROCEDURE — 85027 COMPLETE CBC AUTOMATED: CPT

## 2024-11-22 PROCEDURE — 3074F SYST BP LT 130 MM HG: CPT | Performed by: INTERNAL MEDICINE

## 2024-11-22 RX ORDER — PREGABALIN 25 MG/1
25 CAPSULE ORAL 2 TIMES DAILY
Qty: 60 CAPSULE | Refills: 0 | Status: SHIPPED | OUTPATIENT
Start: 2024-11-22 | End: 2025-05-21

## 2024-11-22 RX ORDER — VENLAFAXINE HYDROCHLORIDE 75 MG/1
150 CAPSULE, EXTENDED RELEASE ORAL DAILY
Start: 2024-11-22

## 2024-11-22 RX ORDER — BUDESONIDE AND FORMOTEROL FUMARATE DIHYDRATE 160; 4.5 UG/1; UG/1
2 AEROSOL RESPIRATORY (INHALATION) 2 TIMES DAILY PRN
Status: SHIPPED
Start: 2024-11-22

## 2024-11-22 ASSESSMENT — ACTIVITIES OF DAILY LIVING (ADL)
DOING_HOUSEWORK: INDEPENDENT
BATHING: INDEPENDENT
DRESSING: INDEPENDENT
TAKING_MEDICATION: INDEPENDENT
GROCERY_SHOPPING: INDEPENDENT
MANAGING_FINANCES: INDEPENDENT

## 2024-11-22 ASSESSMENT — ENCOUNTER SYMPTOMS
DIZZINESS: 0
FLANK PAIN: 0
SHORTNESS OF BREATH: 0
SORE THROAT: 0
SINUS PAIN: 0
PHOTOPHOBIA: 0
HALLUCINATIONS: 0
RHINORRHEA: 0
VOMITING: 0
DEPRESSION: 1
FATIGUE: 0
DYSURIA: 0
LIGHT-HEADEDNESS: 0
APNEA: 0
DIARRHEA: 0
WOUND: 0
RECTAL PAIN: 0
CHILLS: 0
HEADACHES: 0
MYALGIAS: 0
SPEECH DIFFICULTY: 0
JOINT SWELLING: 0
DIAPHORESIS: 0
NERVOUS/ANXIOUS: 0
POLYDIPSIA: 0
DECREASED CONCENTRATION: 0
BLOOD IN STOOL: 0
PALPITATIONS: 1
ABDOMINAL DISTENTION: 0
OCCASIONAL FEELINGS OF UNSTEADINESS: 0
CHEST TIGHTNESS: 0
UNEXPECTED WEIGHT CHANGE: 0
BACK PAIN: 1
SLEEP DISTURBANCE: 0
POLYPHAGIA: 0
BRUISES/BLEEDS EASILY: 0
HYPERACTIVE: 0
WEAKNESS: 0
ADENOPATHY: 0
AGITATION: 0
ACTIVITY CHANGE: 0
ARTHRALGIAS: 1
SINUS PRESSURE: 0
CHOKING: 0
DYSPHORIC MOOD: 0
SEIZURES: 0
CONFUSION: 0
FEVER: 0
VOICE CHANGE: 0
CONSTIPATION: 0
NECK PAIN: 1
HEMATURIA: 0
COLOR CHANGE: 0
EYE PAIN: 0
APPETITE CHANGE: 0
EYE ITCHING: 0
TREMORS: 0
NUMBNESS: 1
FREQUENCY: 0
NAUSEA: 0
DIFFICULTY URINATING: 0
EYE REDNESS: 0
FACIAL ASYMMETRY: 0
COUGH: 1
TROUBLE SWALLOWING: 0
LOSS OF SENSATION IN FEET: 1
FACIAL SWELLING: 0
EYE DISCHARGE: 0
NECK STIFFNESS: 0
WHEEZING: 0
ABDOMINAL PAIN: 0
STRIDOR: 0
ANAL BLEEDING: 0

## 2024-11-22 ASSESSMENT — PATIENT HEALTH QUESTIONNAIRE - PHQ9
SUM OF ALL RESPONSES TO PHQ9 QUESTIONS 1 AND 2: 6
2. FEELING DOWN, DEPRESSED OR HOPELESS: NEARLY EVERY DAY
1. LITTLE INTEREST OR PLEASURE IN DOING THINGS: NEARLY EVERY DAY

## 2024-11-22 NOTE — ASSESSMENT & PLAN NOTE
BP low without symptoms of hypotension   Orders:    Comprehensive Metabolic Panel; Future    CBC; Future    Urinalysis with Reflex Microscopic; Future    Albumin-Creatinine Ratio, Urine Random; Future

## 2024-11-22 NOTE — PROGRESS NOTES
Subjective   Reason for Visit: Maria Elena Mendez is an 69 y.o. female here for a Medicare Wellness visit.          Reviewed all medications by prescribing practitioner or clinical pharmacist (such as prescriptions, OTCs, herbal therapies and supplements) and documented in the medical record.    HPI  Pt here for MWV.  She is down in weight-10 lbs since last year.  Her appetite is good and she is trying to be active.      She is in PT for her shoulder and back.  She saw Dr. Jaimes (ortho) and had cortisol injection.      She had normal mammogram in 7/2023.    She has osteopenia based on dexa in 7/2023.  No falls.    She never had colonoscopy that was ordered.  She did one years ago and per patient did not have polyps.      She is having worsening arthritic pain almost all over.  She also notes worsening neuropathy-numbness of much of her legs-per patient going up to her hip/groin area now.  She tried gabapentin in the past but that caused her to feel drugged.  Her balance is very bad.  She is constantly drifting when walking.      She has some worsening depression.  She is on Effexor but only 75 mg dose.  She is now  from her  and working to get a divorce.  She is now living with her sister.      Patient Care Team:  Jahaira SEBASTIAN DO as PCP - General     Review of Systems   Constitutional:  Negative for activity change, appetite change, chills, diaphoresis, fatigue, fever and unexpected weight change.   HENT:  Negative for congestion, dental problem, drooling, ear discharge, ear pain, facial swelling, hearing loss, mouth sores, nosebleeds, postnasal drip, rhinorrhea, sinus pressure, sinus pain, sneezing, sore throat, tinnitus, trouble swallowing and voice change.    Eyes:  Positive for visual disturbance (has ocular migraines-had recent exam). Negative for photophobia, pain, discharge, redness and itching.   Respiratory:  Positive for cough. Negative for apnea, choking, chest tightness, shortness of  "breath, wheezing and stridor.    Cardiovascular:  Positive for palpitations. Negative for chest pain and leg swelling.   Gastrointestinal:  Negative for abdominal distention, abdominal pain, anal bleeding, blood in stool, constipation, diarrhea, nausea, rectal pain and vomiting.   Endocrine: Negative for cold intolerance, heat intolerance, polydipsia, polyphagia and polyuria.   Genitourinary:  Negative for decreased urine volume, difficulty urinating, dyspareunia, dysuria, enuresis, flank pain, frequency, genital sores, hematuria, menstrual problem, pelvic pain, urgency, vaginal bleeding, vaginal discharge and vaginal pain.   Musculoskeletal:  Positive for arthralgias, back pain and neck pain. Negative for gait problem, joint swelling, myalgias and neck stiffness.   Skin:  Negative for color change, pallor, rash and wound.   Allergic/Immunologic: Negative for environmental allergies, food allergies and immunocompromised state.   Neurological:  Positive for numbness. Negative for dizziness, tremors, seizures, syncope, facial asymmetry, speech difficulty, weakness, light-headedness and headaches.   Hematological:  Negative for adenopathy. Does not bruise/bleed easily.   Psychiatric/Behavioral:  Negative for agitation, behavioral problems, confusion, decreased concentration, dysphoric mood, hallucinations, self-injury, sleep disturbance and suicidal ideas. The patient is not nervous/anxious and is not hyperactive.        Objective   Vitals:  BP 92/62 (BP Location: Left arm, Patient Position: Sitting)   Pulse 84   Resp 17   Ht 1.676 m (5' 6\")   Wt 72.5 kg (159 lb 14.4 oz)   BMI 25.81 kg/m²       Physical Exam  Constitutional:       Appearance: Normal appearance.   HENT:      Head: Normocephalic and atraumatic.      Right Ear: Tympanic membrane, ear canal and external ear normal. There is no impacted cerumen.      Left Ear: Tympanic membrane, ear canal and external ear normal. There is no impacted cerumen.      " Nose: Nose normal. No congestion or rhinorrhea.      Mouth/Throat:      Mouth: Mucous membranes are moist.      Pharynx: Oropharynx is clear. No oropharyngeal exudate or posterior oropharyngeal erythema.   Eyes:      Extraocular Movements: Extraocular movements intact.      Conjunctiva/sclera: Conjunctivae normal.      Pupils: Pupils are equal, round, and reactive to light.   Neck:      Vascular: No carotid bruit.   Cardiovascular:      Rate and Rhythm: Normal rate and regular rhythm.      Pulses: Normal pulses.      Heart sounds: Normal heart sounds. No murmur heard.  Pulmonary:      Effort: Pulmonary effort is normal. No respiratory distress.      Breath sounds: Normal breath sounds. No wheezing, rhonchi or rales.   Abdominal:      General: Abdomen is flat. Bowel sounds are normal. There is no distension.      Palpations: Abdomen is soft.      Tenderness: There is no abdominal tenderness.      Hernia: No hernia is present.   Musculoskeletal:         General: No swelling or tenderness. Normal range of motion.      Cervical back: Normal range of motion and neck supple.      Right lower leg: No edema.      Left lower leg: No edema.   Lymphadenopathy:      Cervical: No cervical adenopathy.   Skin:     General: Skin is warm and dry.      Findings: No lesion or rash.   Neurological:      General: No focal deficit present.      Mental Status: She is alert and oriented to person, place, and time.      Cranial Nerves: No cranial nerve deficit.      Sensory: No sensory deficit.      Motor: No weakness.   Psychiatric:         Mood and Affect: Mood normal.         Behavior: Behavior normal.         Thought Content: Thought content normal.         Judgment: Judgment normal.       Assessment & Plan  Chronic obstructive pulmonary disease, unspecified COPD type (Multi)  Uses inhaler PRN  No current issues  Recommend RSV and covid booster   Flu shot today   Orders:  •  Symbicort 160-4.5 mcg/actuation inhaler; Inhale 2 puffs 2  times a day as needed (breathing). Rinse mouth after use.    Screening for colorectal cancer  Didn't do colonoscopy-hard to get rides  Will do cologuard    Orders:  •  Cologuard® colon cancer screening; Future  •  Cologuard® colon cancer screening    Encounter for screening mammogram for malignant neoplasm of breast    Orders:  •  BI mammo bilateral screening tomosynthesis; Future    Benign essential hypertension  BP low without symptoms of hypotension   Orders:  •  Comprehensive Metabolic Panel; Future  •  CBC; Future  •  Urinalysis with Reflex Microscopic; Future  •  Albumin-Creatinine Ratio, Urine Random; Future    Mixed hyperlipidemia  On statin therapy  Repeat lipids today-did have several cups of coffee with sugar free creamer today   Orders:  •  Lipid Panel; Future    Polyneuropathy associated with underlying disease (Multi)  Had bad side effects from gabapentin  Will try low dose Lyrica to see if able to tolerate  If so will do CSA/urine tox if she remains on this  OARRS reviewed and no history noted    Orders:  •  pregabalin (Lyrica) 25 mg capsule; Take 1 capsule (25 mg) by mouth 2 times a day.  •  Follow Up In Primary Care - Established; Future    Type 2 diabetes mellitus without complication, without long-term current use of insulin (Multi)  A1C has been ok  On Metformin  Repeat A1C and urine     Orders:  •  Hemoglobin A1C; Future  •  Urinalysis with Reflex Microscopic; Future  •  Albumin-Creatinine Ratio, Urine Random; Future    Stage 3b chronic kidney disease (Multi)    Orders:  •  Comprehensive Metabolic Panel; Future  •  CBC; Future  •  Urinalysis with Reflex Microscopic; Future  •  Follow Up In Primary Care - Established; Future    Acquired hypothyroidism  On Levothyroxine 5 days a week    Orders:  •  TSH; Future    Osteopenia, unspecified location    Orders:  •  Vitamin D 25-Hydroxy,Total (for eval of Vitamin D levels); Future    Depression with anxiety  Having worsening mood due to  from    Now living with sister  Will increase dose to 150 mg of Venlafaxine     Orders:  •  venlafaxine XR (Effexor-XR) 75 mg 24 hr capsule; Take 2 capsules (150 mg) by mouth once daily. WITH FOOD  •  Follow Up In Primary Care - Established; Future    Need for influenza vaccination    Orders:  •  Flu vaccine, trivalent, preservative free, HIGH-DOSE, age 65y+ (Fluzone)    Balance disorder  Will do new PT referral to also help with balance issues which are likely from neuropathy    Orders:  •  Referral to Physical Therapy; Future  •  Follow Up In Primary Care - Established; Future    Overweight with body mass index (BMI) of 25 to 25.9 in adult         FLORIN (obstructive sleep apnea)  Unable to tolerate cpap

## 2024-11-22 NOTE — ASSESSMENT & PLAN NOTE
Had bad side effects from gabapentin  Will try low dose Lyrica to see if able to tolerate  If so will do CSA/urine tox if she remains on this  OARRS reviewed and no history noted    Orders:    pregabalin (Lyrica) 25 mg capsule; Take 1 capsule (25 mg) by mouth 2 times a day.    Follow Up In Primary Care - Established; Future

## 2024-11-22 NOTE — ASSESSMENT & PLAN NOTE
Having worsening mood due to  from   Now living with sister  Will increase dose to 150 mg of Venlafaxine     Orders:    venlafaxine XR (Effexor-XR) 75 mg 24 hr capsule; Take 2 capsules (150 mg) by mouth once daily. WITH FOOD    Follow Up In Primary Care - Established; Future

## 2024-11-22 NOTE — ASSESSMENT & PLAN NOTE
On statin therapy  Repeat lipids today-did have several cups of coffee with sugar free creamer today   Orders:    Lipid Panel; Future

## 2024-11-22 NOTE — ASSESSMENT & PLAN NOTE
A1C has been ok  On Metformin  Repeat A1C and urine     Orders:    Hemoglobin A1C; Future    Urinalysis with Reflex Microscopic; Future    Albumin-Creatinine Ratio, Urine Random; Future

## 2024-11-22 NOTE — PATIENT INSTRUCTIONS
Increase your Venlafaxine to 150 mg daily-take 2 of your 75 mg tablets together and let me know when you need a new refill; you should see improvements in mood within two weeks of change  You got your flu shot today; consider covid booster and RSV one time dose  Get fasting blood work and urine done today   Call and schedule your mammogram-order is in  Please complete cologuard once you receive it  Start Lyrica 25 mg twice a day to see if this helps with neuropathy   New referral for PT for the balance as well   Follow up here in 3 months

## 2024-11-22 NOTE — ASSESSMENT & PLAN NOTE
Orders:    Comprehensive Metabolic Panel; Future    CBC; Future    Urinalysis with Reflex Microscopic; Future    Follow Up In Primary Care - Established; Future

## 2024-11-22 NOTE — ASSESSMENT & PLAN NOTE
Uses inhaler PRN  No current issues  Recommend RSV and covid booster   Flu shot today   Orders:    Symbicort 160-4.5 mcg/actuation inhaler; Inhale 2 puffs 2 times a day as needed (breathing). Rinse mouth after use.

## 2024-11-23 LAB
25(OH)D3 SERPL-MCNC: 31 NG/ML (ref 30–100)
ALBUMIN SERPL BCP-MCNC: 4.8 G/DL (ref 3.4–5)
ALP SERPL-CCNC: 46 U/L (ref 33–136)
ALT SERPL W P-5'-P-CCNC: 12 U/L (ref 7–45)
ANION GAP SERPL CALC-SCNC: 16 MMOL/L (ref 10–20)
AST SERPL W P-5'-P-CCNC: 14 U/L (ref 9–39)
BILIRUB SERPL-MCNC: 0.5 MG/DL (ref 0–1.2)
BUN SERPL-MCNC: 27 MG/DL (ref 6–23)
CALCIUM SERPL-MCNC: 9.6 MG/DL (ref 8.6–10.6)
CHLORIDE SERPL-SCNC: 101 MMOL/L (ref 98–107)
CHOLEST SERPL-MCNC: 134 MG/DL (ref 0–199)
CHOLESTEROL/HDL RATIO: 1.8
CO2 SERPL-SCNC: 26 MMOL/L (ref 21–32)
CREAT SERPL-MCNC: 1.93 MG/DL (ref 0.5–1.05)
EGFRCR SERPLBLD CKD-EPI 2021: 28 ML/MIN/1.73M*2
ERYTHROCYTE [DISTWIDTH] IN BLOOD BY AUTOMATED COUNT: 13 % (ref 11.5–14.5)
EST. AVERAGE GLUCOSE BLD GHB EST-MCNC: 137 MG/DL
GLUCOSE SERPL-MCNC: 76 MG/DL (ref 74–99)
HBA1C MFR BLD: 6.4 %
HCT VFR BLD AUTO: 40.4 % (ref 36–46)
HDLC SERPL-MCNC: 75.9 MG/DL
HGB BLD-MCNC: 13.1 G/DL (ref 12–16)
LDLC SERPL CALC-MCNC: 18 MG/DL
MCH RBC QN AUTO: 31.3 PG (ref 26–34)
MCHC RBC AUTO-ENTMCNC: 32.4 G/DL (ref 32–36)
MCV RBC AUTO: 97 FL (ref 80–100)
NON HDL CHOLESTEROL: 58 MG/DL (ref 0–149)
NRBC BLD-RTO: 0 /100 WBCS (ref 0–0)
PLATELET # BLD AUTO: 282 X10*3/UL (ref 150–450)
POTASSIUM SERPL-SCNC: 4.2 MMOL/L (ref 3.5–5.3)
PROT SERPL-MCNC: 6.7 G/DL (ref 6.4–8.2)
RBC # BLD AUTO: 4.18 X10*6/UL (ref 4–5.2)
SODIUM SERPL-SCNC: 139 MMOL/L (ref 136–145)
TRIGL SERPL-MCNC: 200 MG/DL (ref 0–149)
TSH SERPL-ACNC: 2.18 MIU/L (ref 0.44–3.98)
VLDL: 40 MG/DL (ref 0–40)
WBC # BLD AUTO: 12 X10*3/UL (ref 4.4–11.3)

## 2024-11-25 ENCOUNTER — TELEPHONE (OUTPATIENT)
Dept: PRIMARY CARE | Facility: CLINIC | Age: 69
End: 2024-11-25
Payer: MEDICARE

## 2024-11-25 DIAGNOSIS — D72.829 LEUKOCYTOSIS, UNSPECIFIED TYPE: ICD-10-CM

## 2024-11-25 DIAGNOSIS — N18.32 STAGE 3B CHRONIC KIDNEY DISEASE (MULTI): Primary | ICD-10-CM

## 2024-11-25 NOTE — TELEPHONE ENCOUNTER
Informed pt of results, informed to not fast next bloodwork. Pt reported she did not completely fast for this blood test--had coffee and creamer. Will do bloodwork regardless. No other q's concerns at this time.

## 2024-12-02 ENCOUNTER — LAB (OUTPATIENT)
Dept: LAB | Facility: LAB | Age: 69
End: 2024-12-02
Payer: MEDICARE

## 2024-12-02 DIAGNOSIS — D72.829 LEUKOCYTOSIS, UNSPECIFIED TYPE: ICD-10-CM

## 2024-12-02 DIAGNOSIS — E11.9 TYPE 2 DIABETES MELLITUS WITHOUT COMPLICATION, WITHOUT LONG-TERM CURRENT USE OF INSULIN (MULTI): ICD-10-CM

## 2024-12-02 DIAGNOSIS — N18.32 STAGE 3B CHRONIC KIDNEY DISEASE (MULTI): ICD-10-CM

## 2024-12-02 DIAGNOSIS — I10 BENIGN ESSENTIAL HYPERTENSION: ICD-10-CM

## 2024-12-02 PROCEDURE — 80048 BASIC METABOLIC PNL TOTAL CA: CPT

## 2024-12-02 PROCEDURE — 82570 ASSAY OF URINE CREATININE: CPT

## 2024-12-02 PROCEDURE — 81001 URINALYSIS AUTO W/SCOPE: CPT

## 2024-12-02 PROCEDURE — 85027 COMPLETE CBC AUTOMATED: CPT

## 2024-12-02 PROCEDURE — 82043 UR ALBUMIN QUANTITATIVE: CPT

## 2024-12-03 ENCOUNTER — TELEPHONE (OUTPATIENT)
Dept: PRIMARY CARE | Facility: CLINIC | Age: 69
End: 2024-12-03
Payer: MEDICARE

## 2024-12-03 DIAGNOSIS — E11.9 TYPE 2 DIABETES MELLITUS WITHOUT COMPLICATION, WITHOUT LONG-TERM CURRENT USE OF INSULIN (MULTI): ICD-10-CM

## 2024-12-03 DIAGNOSIS — I10 BENIGN ESSENTIAL HYPERTENSION: Primary | ICD-10-CM

## 2024-12-03 DIAGNOSIS — N18.32 STAGE 3B CHRONIC KIDNEY DISEASE (MULTI): ICD-10-CM

## 2024-12-03 LAB
ANION GAP SERPL CALC-SCNC: 15 MMOL/L (ref 10–20)
APPEARANCE UR: CLEAR
BILIRUB UR STRIP.AUTO-MCNC: NEGATIVE MG/DL
BUN SERPL-MCNC: 24 MG/DL (ref 6–23)
CALCIUM SERPL-MCNC: 9.7 MG/DL (ref 8.6–10.6)
CHLORIDE SERPL-SCNC: 100 MMOL/L (ref 98–107)
CO2 SERPL-SCNC: 27 MMOL/L (ref 21–32)
COLOR UR: YELLOW
CREAT SERPL-MCNC: 1.62 MG/DL (ref 0.5–1.05)
CREAT UR-MCNC: 88.2 MG/DL (ref 20–320)
EGFRCR SERPLBLD CKD-EPI 2021: 34 ML/MIN/1.73M*2
ERYTHROCYTE [DISTWIDTH] IN BLOOD BY AUTOMATED COUNT: 13.2 % (ref 11.5–14.5)
GLUCOSE SERPL-MCNC: 98 MG/DL (ref 74–99)
GLUCOSE UR STRIP.AUTO-MCNC: NORMAL MG/DL
HCT VFR BLD AUTO: 42.2 % (ref 36–46)
HGB BLD-MCNC: 13.6 G/DL (ref 12–16)
KETONES UR STRIP.AUTO-MCNC: NEGATIVE MG/DL
LEUKOCYTE ESTERASE UR QL STRIP.AUTO: NEGATIVE
MCH RBC QN AUTO: 31.1 PG (ref 26–34)
MCHC RBC AUTO-ENTMCNC: 32.2 G/DL (ref 32–36)
MCV RBC AUTO: 96 FL (ref 80–100)
MICROALBUMIN UR-MCNC: 30.5 MG/L
MICROALBUMIN/CREAT UR: 34.6 UG/MG CREAT
NITRITE UR QL STRIP.AUTO: NEGATIVE
NRBC BLD-RTO: 0 /100 WBCS (ref 0–0)
PH UR STRIP.AUTO: 7 [PH]
PLATELET # BLD AUTO: 219 X10*3/UL (ref 150–450)
POTASSIUM SERPL-SCNC: 4.2 MMOL/L (ref 3.5–5.3)
PROT UR STRIP.AUTO-MCNC: ABNORMAL MG/DL
RBC # BLD AUTO: 4.38 X10*6/UL (ref 4–5.2)
RBC # UR STRIP.AUTO: NEGATIVE /UL
RBC #/AREA URNS AUTO: NORMAL /HPF
SODIUM SERPL-SCNC: 138 MMOL/L (ref 136–145)
SP GR UR STRIP.AUTO: 1.02
SQUAMOUS #/AREA URNS AUTO: NORMAL /HPF
UROBILINOGEN UR STRIP.AUTO-MCNC: NORMAL MG/DL
WBC # BLD AUTO: 9.6 X10*3/UL (ref 4.4–11.3)
WBC #/AREA URNS AUTO: NORMAL /HPF

## 2024-12-03 RX ORDER — LISINOPRIL 10 MG/1
10 TABLET ORAL DAILY
Qty: 90 TABLET | Refills: 1 | Status: SHIPPED | OUTPATIENT
Start: 2024-12-03 | End: 2025-06-01

## 2024-12-03 NOTE — TELEPHONE ENCOUNTER
Pt is okay with starting low dose lisinopril and stopping hydrodiuril.  One question: her effexor dose was upped to 75mg BID, but her cardiologist said this would be dangerous with her statin med. Please advise.

## 2024-12-03 NOTE — TELEPHONE ENCOUNTER
I am unaware of the risk between venlafaxine and statin-would ask that we get her cardiologist note for me to review if this was a concern  I sent in the lisinopril-it is 10 mg/day; stop hydrochlorothiazide and needs to get blood work in 2 weeks-orders are in (non fasting)

## 2024-12-04 NOTE — TELEPHONE ENCOUNTER
Pt is ultimately okay with disregarding cardiologist's comment about venlafaxine and statin, prefers Dr. Dalal's professional advice. Pt will stop hydrodiuril, get bloodwork in 2 weeks, start lisinopril. Reviewed protein level in urine. No further q's or concerns at this time.

## 2024-12-06 DIAGNOSIS — E78.2 MIXED HYPERLIPIDEMIA: ICD-10-CM

## 2024-12-06 RX ORDER — ROSUVASTATIN CALCIUM 40 MG/1
40 TABLET, COATED ORAL DAILY
Qty: 90 TABLET | Refills: 3 | Status: SHIPPED | OUTPATIENT
Start: 2024-12-06

## 2024-12-13 ENCOUNTER — APPOINTMENT (OUTPATIENT)
Dept: ORTHOPEDIC SURGERY | Facility: CLINIC | Age: 69
End: 2024-12-13
Payer: MEDICARE

## 2024-12-13 DIAGNOSIS — I10 ESSENTIAL (PRIMARY) HYPERTENSION: ICD-10-CM

## 2024-12-14 RX ORDER — METOPROLOL SUCCINATE 25 MG/1
TABLET, EXTENDED RELEASE ORAL DAILY
Qty: 45 TABLET | Refills: 3 | Status: SHIPPED | OUTPATIENT
Start: 2024-12-14

## 2024-12-30 ENCOUNTER — APPOINTMENT (OUTPATIENT)
Dept: ORTHOPEDIC SURGERY | Facility: CLINIC | Age: 69
End: 2024-12-30
Payer: MEDICARE

## 2025-01-02 ENCOUNTER — APPOINTMENT (OUTPATIENT)
Dept: PRIMARY CARE | Facility: CLINIC | Age: 70
End: 2025-01-02
Payer: MEDICARE

## 2025-01-20 ENCOUNTER — APPOINTMENT (OUTPATIENT)
Dept: RADIOLOGY | Facility: CLINIC | Age: 70
End: 2025-01-20
Payer: MEDICARE

## 2025-02-27 ENCOUNTER — APPOINTMENT (OUTPATIENT)
Dept: PRIMARY CARE | Facility: CLINIC | Age: 70
End: 2025-02-27
Payer: MEDICARE

## 2025-03-03 ENCOUNTER — APPOINTMENT (OUTPATIENT)
Dept: PRIMARY CARE | Facility: CLINIC | Age: 70
End: 2025-03-03
Payer: MEDICARE

## 2025-06-05 DIAGNOSIS — E03.9 HYPOTHYROIDISM, UNSPECIFIED: ICD-10-CM

## 2025-06-05 DIAGNOSIS — E11.65 UNCONTROLLED TYPE 2 DIABETES MELLITUS WITH HYPERGLYCEMIA: ICD-10-CM

## 2025-06-05 DIAGNOSIS — N18.32 STAGE 3B CHRONIC KIDNEY DISEASE (MULTI): ICD-10-CM

## 2025-06-05 DIAGNOSIS — E11.9 TYPE 2 DIABETES MELLITUS WITHOUT COMPLICATION, WITHOUT LONG-TERM CURRENT USE OF INSULIN: ICD-10-CM

## 2025-06-05 DIAGNOSIS — R07.89 ATYPICAL CHEST PAIN: ICD-10-CM

## 2025-06-05 DIAGNOSIS — I10 BENIGN ESSENTIAL HYPERTENSION: ICD-10-CM

## 2025-06-05 RX ORDER — PANTOPRAZOLE SODIUM 40 MG/1
40 TABLET, DELAYED RELEASE ORAL DAILY
Qty: 90 TABLET | Refills: 1 | Status: SHIPPED | OUTPATIENT
Start: 2025-06-05 | End: 2025-12-02

## 2025-06-05 RX ORDER — LEVOTHYROXINE SODIUM 88 UG/1
88 TABLET ORAL
Qty: 90 TABLET | Refills: 1 | Status: SHIPPED | OUTPATIENT
Start: 2025-06-05

## 2025-06-05 RX ORDER — METFORMIN HYDROCHLORIDE 500 MG/1
500 TABLET ORAL 2 TIMES DAILY
Qty: 180 TABLET | Refills: 1 | Status: SHIPPED | OUTPATIENT
Start: 2025-06-05

## 2025-06-05 RX ORDER — LISINOPRIL 10 MG/1
10 TABLET ORAL DAILY
Qty: 90 TABLET | Refills: 1 | Status: SHIPPED | OUTPATIENT
Start: 2025-06-05 | End: 2025-12-02

## 2025-06-12 DIAGNOSIS — F41.8 DEPRESSION WITH ANXIETY: ICD-10-CM

## 2025-06-12 RX ORDER — VENLAFAXINE HYDROCHLORIDE 75 MG/1
CAPSULE, EXTENDED RELEASE ORAL
Qty: 90 CAPSULE | Refills: 3 | Status: SHIPPED | OUTPATIENT
Start: 2025-06-12

## 2025-08-18 ENCOUNTER — APPOINTMENT (OUTPATIENT)
Dept: PRIMARY CARE | Facility: CLINIC | Age: 70
End: 2025-08-18
Payer: MEDICARE

## 2025-08-18 VITALS — HEART RATE: 105 BPM | OXYGEN SATURATION: 94 % | DIASTOLIC BLOOD PRESSURE: 71 MMHG | SYSTOLIC BLOOD PRESSURE: 108 MMHG

## 2025-08-18 DIAGNOSIS — G63 POLYNEUROPATHY ASSOCIATED WITH UNDERLYING DISEASE: ICD-10-CM

## 2025-08-18 DIAGNOSIS — R07.89 ATYPICAL CHEST PAIN: ICD-10-CM

## 2025-08-18 DIAGNOSIS — R11.0 NAUSEA: ICD-10-CM

## 2025-08-18 DIAGNOSIS — R31.0 GROSS HEMATURIA: ICD-10-CM

## 2025-08-18 DIAGNOSIS — G31.84 MILD COGNITIVE IMPAIRMENT WITH MEMORY LOSS: ICD-10-CM

## 2025-08-18 DIAGNOSIS — J44.1 COPD WITH ACUTE EXACERBATION (MULTI): Primary | ICD-10-CM

## 2025-08-18 PROCEDURE — 1160F RVW MEDS BY RX/DR IN RCRD: CPT | Performed by: INTERNAL MEDICINE

## 2025-08-18 PROCEDURE — 1123F ACP DISCUSS/DSCN MKR DOCD: CPT | Performed by: INTERNAL MEDICINE

## 2025-08-18 PROCEDURE — 3074F SYST BP LT 130 MM HG: CPT | Performed by: INTERNAL MEDICINE

## 2025-08-18 PROCEDURE — 4010F ACE/ARB THERAPY RXD/TAKEN: CPT | Performed by: INTERNAL MEDICINE

## 2025-08-18 PROCEDURE — 3078F DIAST BP <80 MM HG: CPT | Performed by: INTERNAL MEDICINE

## 2025-08-18 PROCEDURE — 99214 OFFICE O/P EST MOD 30 MIN: CPT | Performed by: INTERNAL MEDICINE

## 2025-08-18 PROCEDURE — 1159F MED LIST DOCD IN RCRD: CPT | Performed by: INTERNAL MEDICINE

## 2025-08-18 RX ORDER — PREDNISONE 20 MG/1
40 TABLET ORAL DAILY
Qty: 10 TABLET | Refills: 0 | Status: SHIPPED | OUTPATIENT
Start: 2025-08-18 | End: 2025-08-23

## 2025-08-18 RX ORDER — DOXYCYCLINE 100 MG/1
100 CAPSULE ORAL 2 TIMES DAILY
Qty: 20 CAPSULE | Refills: 0 | Status: SHIPPED | OUTPATIENT
Start: 2025-08-18 | End: 2025-08-28

## 2025-08-18 RX ORDER — GUAIFENESIN 600 MG/1
1200 TABLET, EXTENDED RELEASE ORAL 2 TIMES DAILY
Qty: 120 TABLET | Refills: 0 | Status: SHIPPED | OUTPATIENT
Start: 2025-08-18 | End: 2025-09-17

## 2025-08-18 RX ORDER — PREGABALIN 25 MG/1
25 CAPSULE ORAL DAILY
Status: SHIPPED
Start: 2025-08-18 | End: 2026-02-14

## 2025-08-18 RX ORDER — PANTOPRAZOLE SODIUM 40 MG/1
40 TABLET, DELAYED RELEASE ORAL DAILY
Start: 2025-08-18 | End: 2026-02-14

## 2025-08-18 ASSESSMENT — ENCOUNTER SYMPTOMS
DIFFICULTY URINATING: 0
CHEST TIGHTNESS: 1
FLANK PAIN: 0
SINUS PAIN: 0
COUGH: 1
NAUSEA: 1
CHOKING: 0
WHEEZING: 0
LIGHT-HEADEDNESS: 0
SHORTNESS OF BREATH: 1
DYSURIA: 0
FREQUENCY: 0
STRIDOR: 0
HEADACHES: 1
DIZZINESS: 1
SINUS PRESSURE: 0
HEMATURIA: 1
APNEA: 0

## 2025-09-07 LAB
APPEARANCE UR: ABNORMAL
BACTERIA #/AREA URNS HPF: ABNORMAL /HPF
BACTERIA UR CULT: ABNORMAL
BACTERIA UR CULT: ABNORMAL
BILIRUB UR QL STRIP: NEGATIVE
CAOX CRY #/AREA URNS HPF: ABNORMAL /HPF
COLOR UR: ABNORMAL
GLUCOSE UR QL STRIP: NEGATIVE
HGB UR QL STRIP: ABNORMAL
HYALINE CASTS #/AREA URNS LPF: ABNORMAL /LPF
KETONES UR QL STRIP: NEGATIVE
LEUKOCYTE ESTERASE UR QL STRIP: ABNORMAL
NITRITE UR QL STRIP: POSITIVE
PH UR STRIP: 5.5 [PH] (ref 5–8)
PROT UR QL STRIP: ABNORMAL
RBC #/AREA URNS HPF: ABNORMAL /HPF
SERVICE CMNT-IMP: ABNORMAL
SP GR UR STRIP: 1.02 (ref 1–1.03)
SQUAMOUS #/AREA URNS HPF: ABNORMAL /HPF
WBC #/AREA URNS HPF: ABNORMAL /HPF
YEAST #/AREA URNS HPF: ABNORMAL /HPF

## 2025-09-22 ENCOUNTER — APPOINTMENT (OUTPATIENT)
Dept: PRIMARY CARE | Facility: CLINIC | Age: 70
End: 2025-09-22
Payer: MEDICARE